# Patient Record
Sex: MALE | Race: OTHER | HISPANIC OR LATINO | Employment: UNEMPLOYED | URBAN - METROPOLITAN AREA
[De-identification: names, ages, dates, MRNs, and addresses within clinical notes are randomized per-mention and may not be internally consistent; named-entity substitution may affect disease eponyms.]

---

## 2020-12-07 ENCOUNTER — HOSPITAL ENCOUNTER (EMERGENCY)
Facility: HOSPITAL | Age: 35
Discharge: HOME | End: 2020-12-07
Attending: EMERGENCY MEDICINE
Payer: OTHER GOVERNMENT

## 2020-12-07 VITALS
RESPIRATION RATE: 16 BRPM | DIASTOLIC BLOOD PRESSURE: 80 MMHG | SYSTOLIC BLOOD PRESSURE: 148 MMHG | TEMPERATURE: 99.4 F | HEART RATE: 74 BPM | OXYGEN SATURATION: 97 % | WEIGHT: 192.68 LBS

## 2020-12-07 DIAGNOSIS — Z20.822 ENCOUNTER FOR LABORATORY TESTING FOR COVID-19 VIRUS: ICD-10-CM

## 2020-12-07 DIAGNOSIS — B34.9 VIRAL SYNDROME: Primary | ICD-10-CM

## 2020-12-07 PROCEDURE — 99282 EMERGENCY DEPT VISIT SF MDM: CPT | Performed by: PHYSICIAN ASSISTANT

## 2020-12-07 PROCEDURE — 99283 EMERGENCY DEPT VISIT LOW MDM: CPT

## 2020-12-07 PROCEDURE — U0003 INFECTIOUS AGENT DETECTION BY NUCLEIC ACID (DNA OR RNA); SEVERE ACUTE RESPIRATORY SYNDROME CORONAVIRUS 2 (SARS-COV-2) (CORONAVIRUS DISEASE [COVID-19]), AMPLIFIED PROBE TECHNIQUE, MAKING USE OF HIGH THROUGHPUT TECHNOLOGIES AS DESCRIBED BY CMS-2020-01-R: HCPCS | Performed by: PHYSICIAN ASSISTANT

## 2020-12-08 LAB — SARS-COV-2 RNA SPEC QL NAA+PROBE: NOT DETECTED

## 2022-06-06 ENCOUNTER — HOSPITAL ENCOUNTER (EMERGENCY)
Facility: HOSPITAL | Age: 37
Discharge: HOME | End: 2022-06-06
Attending: EMERGENCY MEDICINE
Payer: MEDICARE

## 2022-06-06 VITALS
RESPIRATION RATE: 16 BRPM | HEART RATE: 102 BPM | OXYGEN SATURATION: 96 % | TEMPERATURE: 98.4 F | SYSTOLIC BLOOD PRESSURE: 135 MMHG | DIASTOLIC BLOOD PRESSURE: 70 MMHG

## 2022-06-06 DIAGNOSIS — R56.9 SEIZURE (HCC): Primary | ICD-10-CM

## 2022-06-06 PROCEDURE — 99284 EMERGENCY DEPT VISIT MOD MDM: CPT

## 2022-06-06 PROCEDURE — 99282 EMERGENCY DEPT VISIT SF MDM: CPT | Performed by: EMERGENCY MEDICINE

## 2022-06-06 NOTE — ED NOTES
Wife at bedside  Pt refusing blood sugar  Wife acct of seizure-"he was shaking, his eyes rolled back, he was foaming at the mouth    This time when he came around he knew everyone a lot quicker than usual "     Avinash Laurent RN  06/06/22 7597

## 2022-06-06 NOTE — ED ATTENDING ATTESTATION
6/6/2022  IPrince MD, saw and evaluated the patient  I have discussed the patient with the resident/non-physician practitioner and agree with the resident's/non-physician practitioner's findings, Plan of Care, and MDM as documented in the resident's/non-physician practitioner's note, except where noted  All available labs and Radiology studies were reviewed  I was present for key portions of any procedure(s) performed by the resident/non-physician practitioner and I was immediately available to provide assistance  At this point I agree with the current assessment done in the Emergency Department  I have conducted an independent evaluation of this patient a history and physical is as follows:    ED Course     Emergency Department Note- Mirtha Parker 39 y o  male MRN: [id number]    Unit/Bed#: ED 19 Encounter: [id number]    Mirtha Parker is a 39 y o  male who presents with   Chief Complaint   Patient presents with    Seizure - Prior Hx Of     BIBA with PD for seizure per wife  Pt does not wish to stay  A&O         History of Present Illness   HPI:  Mirhta Parker is a 39 y o  male who presents for evaluation of:  Seizures  Patient recently started on new seizure medication and has been compliant  Patient was smoking marijuana this am and had a seizure this am  Patient has had multiple seizures over the last 6 months  Patient denies any traumas, falls, and head strike  Patient was found by EMS to be post ictal      Review of Systems   Constitutional: Negative for chills and fever  HENT: Negative for congestion and rhinorrhea  Respiratory: Negative for cough and shortness of breath  Gastrointestinal: Negative for nausea and vomiting  Neurological: Positive for seizures and syncope  All other systems reviewed and are negative  Historical Information   History reviewed  No pertinent past medical history  History reviewed  No pertinent surgical history    Social History Social History     Substance and Sexual Activity   Alcohol Use Yes    Comment: occasional     Social History     Substance and Sexual Activity   Drug Use Yes    Types: Marijuana    Comment: 1/8th every 2-3days-medical card     Social History     Tobacco Use   Smoking Status Current Every Day Smoker    Packs/day: 0 25   Smokeless Tobacco Never Used     Family History: History reviewed  No pertinent family history  Meds/Allergies   PTA meds:   None     Allergies   Allergen Reactions    Penicillins      unknown       Objective   First Vitals:   Blood Pressure: 135/70 (06/06/22 0606)  Pulse: 102 (06/06/22 0607)  Temperature: 98 4 °F (36 9 °C) (06/06/22 0610)  Temp Source: Tympanic (06/06/22 0610)  Respirations: 16 (06/06/22 0610)  SpO2: 96 % (06/06/22 0607)    Current Vitals:   Blood Pressure: 135/70 (06/06/22 0606)  Pulse: 102 (06/06/22 0607)  Temperature: 98 4 °F (36 9 °C) (06/06/22 0610)  Temp Source: Tympanic (06/06/22 0610)  Respirations: 16 (06/06/22 0610)  SpO2: 96 % (06/06/22 0607)    No intake or output data in the 24 hours ending 06/06/22 0613    Invasive Devices  Report    None                 Physical Exam  Vitals and nursing note reviewed  Constitutional:       General: He is not in acute distress  Appearance: Normal appearance  He is well-developed  HENT:      Head: Normocephalic and atraumatic  Right Ear: External ear normal       Left Ear: External ear normal       Nose: Nose normal       Mouth/Throat:      Pharynx: No oropharyngeal exudate  Eyes:      Conjunctiva/sclera: Conjunctivae normal       Pupils: Pupils are equal, round, and reactive to light  Cardiovascular:      Rate and Rhythm: Normal rate and regular rhythm  Pulmonary:      Effort: Pulmonary effort is normal  No respiratory distress  Abdominal:      General: Abdomen is flat  There is no distension  Palpations: Abdomen is soft  Musculoskeletal:         General: No deformity  Normal range of motion  Cervical back: Normal range of motion and neck supple  Skin:     General: Skin is warm and dry  Capillary Refill: Capillary refill takes less than 2 seconds  Neurological:      General: No focal deficit present  Mental Status: He is alert and oriented to person, place, and time  Mental status is at baseline  Coordination: Coordination normal    Psychiatric:         Mood and Affect: Mood normal          Behavior: Behavior normal          Thought Content: Thought content normal          Judgment: Judgment normal            Medical Decision Makin  Seizure episode this am: patient is back to baseline and is refusing work up in the ED  Patient is scheduled to f/u with neurosurgery at [facility]  Patient does not drive  No results found for this or any previous visit (from the past 36 hour(s))  No orders to display         Portions of the record may have been created with voice recognition software  Occasional wrong word or "sound a like" substitutions may have occurred due to the inherent limitations of voice recognition software  Read the chart carefully and recognize, using context, where substitutions have occurred          Critical Care Time  Procedures

## 2022-06-06 NOTE — ED PROVIDER NOTES
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, currently presenting brought in by EMS following a witnessed seizure  As per EMS, patient had a seizure that was witnessed by his wife earlier this morning  This lasted for several seconds to minutes  It seems to be described this generalized body shaking  Because of this his wife called EMS  On arrival, the patient initially appeared postictal has per EMS, however he has gradually returned to his baseline mentation en route to the hospital   He states he has been taking his medications as prescribed  He has not been ill at all lately  He denies fevers or chills  He denies any recent trauma or head strikes  Denies any headaches  He denies any numbness, tingling, weakness  He states that he did not hurt himself during this seizure, and does not have any pain anywhere  He has no neck stiffness  He did not bite his tongue  He denies any other drug use  His wife is bedside and states that he is acting his normal self  He is requesting to leave  None       History reviewed  No pertinent past medical history  History reviewed  No pertinent surgical history  History reviewed  No pertinent family history  I have reviewed and agree with the history as documented  E-Cigarette/Vaping     E-Cigarette/Vaping Substances     Social History     Tobacco Use    Smoking status: Current Every Day Smoker     Packs/day: 0 25    Smokeless tobacco: Never Used   Substance Use Topics    Alcohol use: Yes     Comment: occasional    Drug use: Yes     Types: Marijuana     Comment: 1/8th every 2-3days-medical card        Review of Systems   Constitutional: Negative for chills and fever  HENT: Negative for sore throat      Eyes: Negative for visual disturbance  Respiratory: Negative for cough and shortness of breath  Cardiovascular: Negative for chest pain and leg swelling  Gastrointestinal: Negative for abdominal pain, constipation, diarrhea, nausea and vomiting  Genitourinary: Negative for dysuria  Musculoskeletal: Negative for back pain, neck pain and neck stiffness  Skin: Negative for rash  Neurological: Positive for seizures and syncope  Negative for dizziness, weakness, light-headedness, numbness and headaches  Psychiatric/Behavioral: Negative for agitation  All other systems reviewed and are negative  Physical Exam  ED Triage Vitals   Temperature Pulse Respirations Blood Pressure SpO2   06/06/22 0610 06/06/22 0607 06/06/22 0610 06/06/22 0606 06/06/22 0607   98 4 °F (36 9 °C) 102 16 135/70 96 %      Temp Source Heart Rate Source Patient Position - Orthostatic VS BP Location FiO2 (%)   06/06/22 0610 -- -- -- --   Tympanic          Pain Score       --                    Orthostatic Vital Signs  Vitals:    06/06/22 0606 06/06/22 0607   BP: 135/70    Pulse:  102       Physical Exam  Vitals and nursing note reviewed  Constitutional:       General: He is not in acute distress  Appearance: Normal appearance  He is not ill-appearing or toxic-appearing  HENT:      Head: Normocephalic and atraumatic  Right Ear: External ear normal       Left Ear: External ear normal       Nose: Nose normal       Mouth/Throat:      Mouth: Mucous membranes are moist       Comments: No tongue biting  Eyes:      General: No visual field deficit or scleral icterus  Right eye: No discharge  Left eye: No discharge  Extraocular Movements: Extraocular movements intact  Conjunctiva/sclera: Conjunctivae normal       Pupils: Pupils are equal, round, and reactive to light  Cardiovascular:      Rate and Rhythm: Normal rate and regular rhythm  Pulses: Normal pulses  Heart sounds: Normal heart sounds   No murmur heard     No friction rub  No gallop  Pulmonary:      Effort: Pulmonary effort is normal  No respiratory distress  Breath sounds: Normal breath sounds  No wheezing, rhonchi or rales  Abdominal:      General: Abdomen is flat  There is no distension  Palpations: Abdomen is soft  There is no mass  Tenderness: There is no abdominal tenderness  Genitourinary:     Comments: Deferred  Musculoskeletal:         General: Normal range of motion  Cervical back: Normal range of motion  No rigidity or tenderness  Right lower leg: No edema  Left lower leg: No edema  Comments: No signs of trauma or shoulder dislocation   Skin:     General: Skin is warm and dry  Neurological:      General: No focal deficit present  Mental Status: He is alert and oriented to person, place, and time  Mental status is at baseline  GCS: GCS eye subscore is 4  GCS verbal subscore is 5  GCS motor subscore is 6  Cranial Nerves: Cranial nerves are intact  No cranial nerve deficit, dysarthria or facial asymmetry  Sensory: Sensation is intact  No sensory deficit  Motor: Motor function is intact  No pronator drift  Coordination: Coordination is intact  Finger-Nose-Finger Test and Heel to [place] nida Test normal       Comments: As per wife who is bedside, patient is at baseline, no intention tremor, no tongue fasiculations   Psychiatric:         Mood and Affect: Mood normal          ED Medications  Medications - No data to display    Diagnostic Studies  Results Reviewed     None                 No orders to display         Procedures  Procedures      ED Course                                       MDM  Number of Diagnoses or Management Options  Seizure [facility]): new and requires workup  Diagnosis management comments: Patient is a 39year old male with a diagnosed seizure disorder, presenting at his baseline in a non convulsive state following a witnessed seizure  Impression is breakthrough seizure  Doubt other causes of seizure including drug / toxin etiologies (ETOH, stimulants, medication side effects), metabolic disturbances (glucose, Na), acute CNS infections (meningitis, encephalitis, abscess), ICH / tumor / CVA  Presentation not consistent with non-epileptic type seizure to include syncope, neurologic etiologies (vertebrobasilar insufficiency, movement disorder, migraine), impact seizure related to head trauma  Discussed and offered further workup for this suspected seizure including labwork  Patient and wife currently refusing and requesting to leave  Using shared decision making, discussed patient calling his neurologist promptly upon discharge regarding his visit for further evaluation and management at discretion of neurologist  Andrés Canela patient that he may not drive (which he already does not) or participate in high risk activities secondary to his seizure history, which he is well aware of  Patient seems to understand this plan and is agreeable  All questions answered  Patient discharged home with return precautions  Amount and/or Complexity of Data Reviewed  Obtain history from someone other than the patient: yes  Review and summarize past medical records: yes    Patient Progress  Patient progress: stable      Disposition  Final diagnoses:   Seizure [facility])     Time reflects when diagnosis was documented in both MDM as applicable and the Disposition within this note     Time User Action Codes Description Comment    6/6/2022  6:23 AM Chris Velez Add [R56 9] Seizure [facility])       ED Disposition     ED Disposition   Discharge    Condition   Stable    Date/Time   Mon Jun 6, 2022  6:25 AM    Comment   Mukesh Burgos discharge to home/self care  Follow-up Information    None         There are no discharge medications for this patient  No discharge procedures on file      PDMP Review     None           ED Provider  Attending physically available and evaluated [facility] Bonilla HERNANDEZ managed the patient along with the ED Attending      Electronically Signed by         Donis Coreas DO  06/06/22 5688

## 2022-06-06 NOTE — DISCHARGE INSTRUCTIONS
You were seen in the ED today with a seizure  Please continue to follow up with your neurologist  Please call them today to inform them you had a seizure  Please continue to take your medications as prescribed  Please return to the ED if you have worsening symptoms, or any other concerns

## 2023-02-25 ENCOUNTER — APPOINTMENT (EMERGENCY)
Dept: RADIOLOGY | Facility: HOSPITAL | Age: 38
End: 2023-02-25

## 2023-02-25 ENCOUNTER — HOSPITAL ENCOUNTER (EMERGENCY)
Facility: HOSPITAL | Age: 38
Discharge: ANOTHER HOSPITAL | End: 2023-02-25
Attending: EMERGENCY MEDICINE

## 2023-02-25 ENCOUNTER — APPOINTMENT (EMERGENCY)
Dept: CT IMAGING | Facility: HOSPITAL | Age: 38
End: 2023-02-25

## 2023-02-25 VITALS
RESPIRATION RATE: 20 BRPM | DIASTOLIC BLOOD PRESSURE: 73 MMHG | SYSTOLIC BLOOD PRESSURE: 123 MMHG | WEIGHT: 152 LBS | HEART RATE: 82 BPM | TEMPERATURE: 99.3 F | BODY MASS INDEX: 24.72 KG/M2 | OXYGEN SATURATION: 98 %

## 2023-02-25 DIAGNOSIS — I62.9 ACUTE INTRACRANIAL HEMORRHAGE (HCC): Primary | ICD-10-CM

## 2023-02-25 DIAGNOSIS — R56.9 SEIZURE (HCC): ICD-10-CM

## 2023-02-25 LAB
ALBUMIN SERPL BCP-MCNC: 4.8 G/DL (ref 3.5–5)
ALP SERPL-CCNC: 61 U/L (ref 43–122)
ALT SERPL W P-5'-P-CCNC: 30 U/L
ANION GAP SERPL CALCULATED.3IONS-SCNC: 26 MMOL/L (ref 5–14)
APTT PPP: 24 SECONDS (ref 23–37)
AST SERPL W P-5'-P-CCNC: 38 U/L (ref 17–59)
BASOPHILS # BLD AUTO: 0.09 THOUSANDS/ÂΜL (ref 0–0.1)
BASOPHILS NFR BLD AUTO: 1 % (ref 0–1)
BILIRUB SERPL-MCNC: 0.54 MG/DL (ref 0.2–1)
BUN SERPL-MCNC: 13 MG/DL (ref 5–25)
CALCIUM SERPL-MCNC: 9.3 MG/DL (ref 8.4–10.2)
CHLORIDE SERPL-SCNC: 104 MMOL/L (ref 96–108)
CO2 SERPL-SCNC: 10 MMOL/L (ref 21–32)
CREAT SERPL-MCNC: 0.91 MG/DL (ref 0.7–1.5)
EOSINOPHIL # BLD AUTO: 0.39 THOUSAND/ÂΜL (ref 0–0.61)
EOSINOPHIL NFR BLD AUTO: 2 % (ref 0–6)
ERYTHROCYTE [DISTWIDTH] IN BLOOD BY AUTOMATED COUNT: 14.3 % (ref 11.6–15.1)
GFR SERPL CREATININE-BSD FRML MDRD: 107 ML/MIN/1.73SQ M
GLUCOSE SERPL-MCNC: 122 MG/DL (ref 65–140)
GLUCOSE SERPL-MCNC: 134 MG/DL (ref 70–99)
HCT VFR BLD AUTO: 49.4 % (ref 36.5–49.3)
HGB BLD-MCNC: 14.5 G/DL (ref 12–17)
IMM GRANULOCYTES # BLD AUTO: 0.18 THOUSAND/UL (ref 0–0.2)
IMM GRANULOCYTES NFR BLD AUTO: 1 % (ref 0–2)
INR PPP: 1.02 (ref 0.84–1.19)
LYMPHOCYTES # BLD AUTO: 4.92 THOUSANDS/ÂΜL (ref 0.6–4.47)
LYMPHOCYTES NFR BLD AUTO: 25 % (ref 14–44)
MCH RBC QN AUTO: 27.2 PG (ref 26.8–34.3)
MCHC RBC AUTO-ENTMCNC: 29.4 G/DL (ref 31.4–37.4)
MCV RBC AUTO: 93 FL (ref 82–98)
MONOCYTES # BLD AUTO: 1.8 THOUSAND/ÂΜL (ref 0.17–1.22)
MONOCYTES NFR BLD AUTO: 9 % (ref 4–12)
NEUTROPHILS # BLD AUTO: 12.52 THOUSANDS/ÂΜL (ref 1.85–7.62)
NEUTS SEG NFR BLD AUTO: 62 % (ref 43–75)
NRBC BLD AUTO-RTO: 0 /100 WBCS
PLATELET # BLD AUTO: 306 THOUSANDS/UL (ref 149–390)
PMV BLD AUTO: 9.6 FL (ref 8.9–12.7)
POTASSIUM SERPL-SCNC: 3.8 MMOL/L (ref 3.5–5.3)
PROT SERPL-MCNC: 8 G/DL (ref 6.4–8.4)
PROTHROMBIN TIME: 13.7 SECONDS (ref 11.6–14.5)
RBC # BLD AUTO: 5.33 MILLION/UL (ref 3.88–5.62)
SODIUM SERPL-SCNC: 140 MMOL/L (ref 135–147)
TSH SERPL DL<=0.05 MIU/L-ACNC: 3.52 UIU/ML (ref 0.45–4.5)
WBC # BLD AUTO: 19.9 THOUSAND/UL (ref 4.31–10.16)

## 2023-02-25 RX ORDER — ACETAMINOPHEN 325 MG/1
975 TABLET ORAL ONCE
Status: DISCONTINUED | OUTPATIENT
Start: 2023-02-25 | End: 2023-02-25 | Stop reason: HOSPADM

## 2023-02-25 RX ORDER — LORAZEPAM 2 MG/ML
2 INJECTION INTRAMUSCULAR ONCE
Status: COMPLETED | OUTPATIENT
Start: 2023-02-25 | End: 2023-02-25

## 2023-02-25 RX ORDER — GINSENG 100 MG
1 CAPSULE ORAL ONCE
Status: COMPLETED | OUTPATIENT
Start: 2023-02-25 | End: 2023-02-25

## 2023-02-25 RX ORDER — LORAZEPAM 2 MG/ML
INJECTION INTRAMUSCULAR
Status: DISCONTINUED
Start: 2023-02-25 | End: 2023-02-25 | Stop reason: HOSPADM

## 2023-02-25 RX ORDER — LORAZEPAM 2 MG/ML
2 INJECTION INTRAMUSCULAR ONCE
Status: DISCONTINUED | OUTPATIENT
Start: 2023-02-25 | End: 2023-02-25

## 2023-02-25 RX ORDER — LEVETIRACETAM 500 MG/1
1000 TABLET ORAL 2 TIMES DAILY
Qty: 120 TABLET | Refills: 0 | Status: SHIPPED | OUTPATIENT
Start: 2023-02-25 | End: 2023-03-27

## 2023-02-25 RX ORDER — SODIUM CHLORIDE 9 MG/ML
100 INJECTION, SOLUTION INTRAVENOUS CONTINUOUS
Status: DISCONTINUED | OUTPATIENT
Start: 2023-02-25 | End: 2023-02-25 | Stop reason: HOSPADM

## 2023-02-25 RX ORDER — ONDANSETRON 2 MG/ML
4 INJECTION INTRAMUSCULAR; INTRAVENOUS ONCE
Status: COMPLETED | OUTPATIENT
Start: 2023-02-25 | End: 2023-02-25

## 2023-02-25 RX ADMIN — LORAZEPAM 2 MG: 2 INJECTION INTRAMUSCULAR; INTRAVENOUS at 11:54

## 2023-02-25 RX ADMIN — LEVETIRACETAM 1000 MG: 100 INJECTION, SOLUTION INTRAVENOUS at 12:38

## 2023-02-25 RX ADMIN — SODIUM CHLORIDE 100 ML/HR: 0.9 INJECTION, SOLUTION INTRAVENOUS at 14:43

## 2023-02-25 RX ADMIN — BACITRACIN 1 SMALL APPLICATION: 500 OINTMENT TOPICAL at 14:18

## 2023-02-25 RX ADMIN — ONDANSETRON 4 MG: 2 INJECTION INTRAMUSCULAR; INTRAVENOUS at 18:13

## 2023-02-25 RX ADMIN — LORAZEPAM 2 MG: 2 INJECTION INTRAMUSCULAR; INTRAVENOUS at 12:05

## 2023-02-25 RX ADMIN — SODIUM CHLORIDE 1000 ML: 0.9 INJECTION, SOLUTION INTRAVENOUS at 13:09

## 2023-02-25 NOTE — ED NOTES
Neuro assessment deferred as patient is sleeping; no distress noted; continuous cardiac and pulse oximetry continues     Conor Costa RN  02/25/23 [id number]

## 2023-02-25 NOTE — Clinical Note
Date: 2/25/2023  Patient: Jenaro Bucio  Admitted: 2/25/2023 11:05 AM  Attending Provider: Laurie Jones, *    Jenaro Bucio or his authorized caregiver has made the decision for the patient to leave the emergency department agains t the advice of his attending physician  He or his authorized caregiver has been informed and understands the inherent risks, including death, recurrent seizure  He or his authorized caregiver has decided to accept the responsibility for this decisi on  Jenaro Bucio and all necessary parties have been advised that he may return for further evaluation or treatment  His condition at time of discharge was 1118  Jenaro Bucio had current vital signs as follows: There were no vitals take n for this visit

## 2023-02-25 NOTE — ED NOTES
Per wife's request, patients mother was called  L/M for patients mother to contact ED  Patient wife tearful at bedside speaking with this RN and provider        Cande Ward RN  02/25/23 [id number]

## 2023-02-25 NOTE — ED NOTES
Patient transported to [address] [place], [place] [address]WILIAM  02/25/23 [address], RN  02/25/23 1787

## 2023-02-25 NOTE — ED NOTES
Patient continues to refuse any treatment or interventions; patient states he is fine and he wants to go home  Patient will not allow me to obtain vital signs and he will not answer any assessment questions, he continues to say he is fine and he wants to go home       Rick Aleman RN  02/25/23 2358

## 2023-02-25 NOTE — ED NOTES
Patient vomiting yellow bile and c/o headache   Provider aware, zofran administered, will re attempt tylenol     Franklin Ernandez RN  02/25/23 9702

## 2023-02-25 NOTE — ED PROVIDER NOTES
History  Chief Complaint   Patient presents with   • Behavior Problem     Patient arrives with EMS and police  Per wife patient had a seizure at home, he then ran outside barefoot and his wife called EMS d/t this behavior  Patient does not believe he had a seizure  Patient is refusing triage process     26-year-old male history of seizures, AVM resection 7 months ago at [facility] brought in by EMS after a seizure  Wife states that he used to have seizures, was on Keppra and Depakote, had the resection 7 months ago and was told that he would not have seizures after that  Was maintained on Depakote but patient felt that since he was not having seizures and he had the surgery he did not need the medications and has been off for at least a month  This morning, wife found patient on the ground and had a seizure  Afterwards, patient was confused, postictal, ran away from wife, ran into the street barefoot in 20 degree weather  Was picked up by EMS and police  Patient initially refused all care and evaluation  He was A&Ox4 though denied having a seizure  Says that he was walking down the street to go to his mother's house  Cannot account for why he was barefoot  Patient was initially going to leave AMA saying that he knew he could have a potential head bleed or severely abnormal vital signs but still did not want evaluation, he verbalized that he knew he could have another seizure and that he could die if not further evaluated  Per wife, no recent sicknesses, drug use, alcohol use  As we were getting ready to sign AMA, patient did agree to at least get a CT head  As he got to the CT scanner, had a witnessed tonic-clonic seizure  Was unconscious for approximately 3 minutes  Did not fall or hit his head during the seizure  Rapid response was called  On arrival, patient was confused, bilaterally dilated pupils    Given 2 mg Ativan x2 as patient was too confused to cooperate with exam   At times did seem to stop responding and eyes were rolling back and forth however he would come back around after some stimulation  None       History reviewed  No pertinent past medical history  History reviewed  No pertinent surgical history  History reviewed  No pertinent family history  I have reviewed and agree with the history as documented  E-Cigarette/Vaping     E-Cigarette/Vaping Substances   • Nicotine No    • THC No    • CBD No    • Flavoring No    • Other No    • Unknown No      Social History     Tobacco Use   • Smoking status: Every Day     Packs/day: 0 25     Types: Cigarettes   • Smokeless tobacco: Never   Substance Use Topics   • Alcohol use: Yes     Comment: occasional   • Drug use: Yes     Types: Marijuana     Comment: 1/8th every 2-3days-medical card        Review of Systems   Unable to perform ROS: Mental status change   Skin: Positive for wound  Neurological: Positive for seizures  Psychiatric/Behavioral: Positive for confusion  Physical Exam  ED Triage Vitals   Temperature Pulse Respirations Blood Pressure SpO2   02/25/23 1117 02/25/23 1117 02/25/23 1117 02/25/23 1117 02/25/23 1117   98 °F (36 7 °C) 93 18 131/78 96 %      Temp Source Heart Rate Source Patient Position - Orthostatic VS BP Location FiO2 (%)   02/25/23 1746 02/25/23 1856 02/25/23 1856 02/25/23 1856 --   Oral Monitor Lying Left arm       Pain Score       --                    Orthostatic Vital Signs  Vitals:    02/25/23 1539 02/25/23 1600 02/25/23 1746 02/25/23 1856   BP: 99/56 102/58 129/72 123/73   Pulse: 84 84 99 82   Patient Position - Orthostatic VS:    Lying       Physical Exam  Vitals and nursing note reviewed  Constitutional:       General: He is in acute distress  HENT:      Head: Normocephalic and atraumatic  Right Ear: External ear normal       Left Ear: External ear normal       Nose: Nose normal    Eyes:      General: No scleral icterus  Right eye: No discharge  Left eye: No discharge  Pupils: Pupils are equal, round, and reactive to light  Cardiovascular:      Rate and Rhythm: Normal rate  Pulses: Normal pulses  Pulmonary:      Effort: Pulmonary effort is normal  No respiratory distress  Breath sounds: No stridor  Abdominal:      General: Abdomen is flat  There is no distension  Hernia: No hernia is present  Musculoskeletal:         General: Normal range of motion  Cervical back: Normal range of motion  Feet:    Skin:     General: Skin is warm and dry  Coloration: Skin is not jaundiced  Neurological:      General: No focal deficit present  Mental Status: He is alert and oriented to person, place, and time  Mental status is at baseline  Psychiatric:         Mood and Affect: Mood normal          Behavior: Behavior normal          ED Medications  Medications   levETIRAcetam (KEPPRA) 1,000 mg in sodium chloride 0 9 % 100 mL IVPB (0 mg Intravenous Stopped 2/25/23 1255)   LORazepam (ATIVAN) injection 2 mg (2 mg Intramuscular Given 2/25/23 1154)   LORazepam (ATIVAN) injection 2 mg (2 mg Intramuscular Given 2/25/23 1205)   sodium chloride 0 9 % bolus 1,000 mL (0 mL Intravenous Stopped 2/25/23 1411)   bacitracin topical ointment 1 small application (1 small application Topical Given 2/25/23 1418)   ondansetron (ZOFRAN) injection 4 mg (4 mg Intravenous Given 2/25/23 1813)       Diagnostic Studies  Results Reviewed     Procedure Component Value Units Date/Time    TSH, 3rd generation with Free T4 reflex [[id number]]  (Normal) Collected: 02/25/23 1224    Lab Status: Final result Specimen: Blood from Arm, Right Updated: 02/25/23 1324     TSH 3RD GENERATON 3 520 uIU/mL     Narrative:      Patients undergoing fluorescein dye angiography may retain small amounts of fluorescein in the body for 48-72 hours post procedure  Samples containing fluorescein can produce falsely depressed TSH values   If the patient had this procedure,a specimen should be resubmitted post fluorescein clearance        Comprehensive metabolic panel [[id number]]  (Abnormal) Collected: 02/25/23 1224    Lab Status: Final result Specimen: Blood from Arm, Right Updated: 02/25/23 1248     Sodium 140 mmol/L      Potassium 3 8 mmol/L      Chloride 104 mmol/L      CO2 10 mmol/L      ANION GAP 26 mmol/L      BUN 13 mg/dL      Creatinine 0 91 mg/dL      Glucose 134 mg/dL      Calcium 9 3 mg/dL      AST 38 U/L      ALT 30 U/L      Alkaline Phosphatase 61 U/L      Total Protein 8 0 g/dL      Albumin 4 8 g/dL      Total Bilirubin 0 54 mg/dL      eGFR 107 ml/min/1 73sq m     Narrative:      [place] guidelines for Chronic Kidney Disease (CKD):   •  Stage 1 with normal or high GFR (GFR > 90 mL/min/1 73 square meters)  •  Stage 2 Mild CKD (GFR = 60-89 mL/min/1 73 square meters)  •  Stage 3A Moderate CKD (GFR = 45-59 mL/min/1 73 square meters)  •  Stage 3B Moderate CKD (GFR = 30-44 mL/min/1 73 square meters)  •  Stage 4 Severe CKD (GFR = 15-29 mL/min/1 73 square meters)  •  Stage 5 End Stage CKD (GFR <15 mL/min/1 73 square meters)  Note: GFR calculation is accurate only with a steady state creatinine    Protime-INR [[id number]]  (Normal) Collected: 02/25/23 1224    Lab Status: Final result Specimen: Blood from Arm, Right Updated: 02/25/23 1243     Protime 13 7 seconds      INR 1 02    APTT [[id number]]  (Normal) Collected: 02/25/23 1224    Lab Status: Final result Specimen: Blood from Arm, Right Updated: 02/25/23 1243     PTT 24 seconds     CBC and differential [[id number]]  (Abnormal) Collected: 02/25/23 1224    Lab Status: Final result Specimen: Blood from Arm, Right Updated: 02/25/23 1231     WBC 19 90 Thousand/uL      RBC 5 33 Million/uL      Hemoglobin 14 5 g/dL      Hematocrit 49 4 %      MCV 93 fL      MCH 27 2 pg      MCHC 29 4 g/dL      RDW 14 3 %      MPV 9 6 fL      Platelets 874 Thousands/uL      nRBC 0 /100 WBCs      Neutrophils Relative 62 %      Immat GRANS % 1 %      Lymphocytes Relative 25 %      Monocytes Relative 9 %      Eosinophils Relative 2 %      Basophils Relative 1 %      Neutrophils Absolute 12 52 Thousands/µL      Immature Grans Absolute 0 18 Thousand/uL      Lymphocytes Absolute 4 92 Thousands/µL      Monocytes Absolute 1 80 Thousand/µL      Eosinophils Absolute 0 39 Thousand/µL      Basophils Absolute 0 09 Thousands/µL     Fingerstick Glucose (POCT) [[id number]]  (Normal) Collected: 02/25/23 1222    Lab Status: Final result Updated: 02/25/23 1223     POC Glucose 122 mg/dl                  XR foot 3+ vw right   Final Result by Crys Crockett MD (02/26 0826)      No acute osseous abnormality  Plantar spur  Workstation performed: [id number]         CT head without contrast   Final Result by Markus Drew MD (02/25 0922)      Focal 1 4 cm region of acute blood products noted in the left lateral frontal lobe and adjacent subarachnoid space with regional surgical clips consistent with the patient's reported history of AVM clipping  There is no significant parenchymal edema or    regional mass effect at this time  Direct comparison with prior imaging is recommended  Follow-up with the patient's treating neurosurgical/neuro interventional service is recommended  Postoperative changes of left lateral frontal parietal craniotomy and small postsurgical fluid collection subjacent to the cranial flap             I personally discussed this study with Dr Oswaldo Weathers on 2/25/2023 at 12:57 PM                       I personally discussed this study with Becky Quiñones on 2/25/2023 at 12:50 PM                            Workstation performed: [id number]               Procedures  Procedures      ED Course  ED Course as of 02/26/23 1813   Sat Feb 25, 2023   1225 POC Glucose: 122   1232 WBC(!): 19 90  Possibly reactive from the seizure   1301 Focal 1 4 cm region of acute blood products noted in the left lateral frontal lobe and adjacent subarachnoid space with regional surgical clips consistent with the patient's reported history of AVM clipping  There is no significant parenchymal edema or   regional mass effect at this time  Direct comparison with prior imaging is recommended  Follow-up with the patient's treating neurosurgical/neuro interventional service is recommended      Postoperative changes of left lateral frontal parietal craniotomy and small postsurgical fluid collection subjacent to the cranial flap  1325 TSH 3RD GENERATON: 3 520                                       Medical Decision Making  Initial Impression: Worried about his recurrent seizures  Possibility that this is just from not being on his medications but also need to assess him for possibility of intracranial hemorrhage  We will try to obtain blood work and blood glucose level if patient is agreeable to these exams  Initial Work Up: CT head    Final Impression: CT scan shows evidence of acute hemorrhage, though no prior imaging to compare against, no mention of previous hyperdensities in previous reports  Not in status epilepticus  Not hypoglycemic  No other explanation for patient's presentation  Will have patient transferred to [facility] for further evaluation by neurosurgery and for continuity of care  In regards to pt's toe injury, did an xray which did not show any acute fx or dislocation  Tdap was up to date based on chart review  Wound cleaned and bacitracin applied  Acute intracranial hemorrhage ([facility] [address] ): complicated acute illness or injury with systemic symptoms that poses a threat to life or bodily functions  Seizure [facility]): complicated acute illness or injury with systemic symptoms that poses a threat to life or bodily functions  Amount and/or Complexity of Data Reviewed  Independent Historian: jere  External Data Reviewed: radiology and notes  Labs: ordered  Decision-making details documented in ED Course  Radiology: ordered    Discussion of management or test interpretation with external provider(s): Coordinated care with [facility] neurosurgery and neuro-critical care regarding comparing new images and transfer of pt    Risk  OTC drugs  Prescription drug management  Risk Details: Treated patient for life threatening intracranial hemorrhage, recurrent seizures   Also assessed for electrolyte abnormalities, hypoglycemia          Disposition  Final diagnoses:   Seizure ([facility] [address] )   Acute intracranial hemorrhage ([facility] [address] )     Time reflects when diagnosis was documented in both MDM as applicable and the Disposition within this note     Time User Action Codes Description Comment    2/25/2023 11:17 AM Cyrena Spruce Add [R56 9] Seizure ([facility] [address] )     2/25/2023  1:49 PM Cyrena Spruce Add [I62 9] Acute intracranial hemorrhage ([facility] [address] )     2/25/2023  1:49 PM Cyrena Spruce Modify [R56 9] Seizure ([facility] [address] )     2/25/2023  1:49 PM Cyrena Spruce Modify [I62 9] Acute intracranial hemorrhage [facility])       ED Disposition     ED Disposition   Transfer to Another [facility]    Condition   --    Date/Time   Sat Feb 25, 2023  2:23 PM    Comment   Quinten Benito should be transferred out to [facility]            MD Documentation    [address] [facility] Most Recent Value   Patient Condition The patient has been stabilized such that within reasonable medical probability, no material deterioration of the patient condition or the condition of the unborn child(emma) is likely to result from the transfer   Reason for Transfer Level of Care needed not available at this facility   Benefits of Transfer Specialized equipment and/or services available at the receiving facility (Include comment)________________________   Risks of Transfer Potential for delay in receiving treatment, Loss of IV   Accepting Physician Dr Claritza Chiang Name, [address] [facility]    (Name & Tel number) Severiano Roup   Sending MD Lachelle Nino   Provider Certification General risk, such as traffic hazards, adverse weather conditions, rough terrain or turbulence, possible failure of equipment (including vehicle or aircraft), or consequences of actions of persons outside the control of the transport personnel      RN Documentation    Flowsheet Row Most [address] Name, [address] [facility]    (Name & Tel number) Priya Piero   Medications Reviewed with Next Provider of Service Yes   Transport Mode Ambulance   Level of Care Advanced life support   Copies of Medical Records Sent Radiology      Follow-up Information    None         Discharge Medication List as of 2/25/2023 11:19 AM      START taking these medications    Details   levETIRAcetam (Keppra) 500 mg tablet Take 2 tablets (1,000 mg total) by mouth 2 (two) times a day, Starting Sat 2/25/2023, Until Mon 3/27/2023, Print           No discharge procedures on file  PDMP Review     None           ED Provider  Attending physically available and evaluated Samir Daigle  DAVID managed the patient along with the ED Attending      Electronically Signed by         Yvonne Turpin MD  02/26/23 9901

## 2023-02-25 NOTE — ED NOTES
At approx 1150 a rapid response was called for this patient d/t seizure like activity while in hallway outside of cat scan  2mg IM ativan was given at 1154  Cat scan was re attempted and the patient became agitated which interrupted the completion of the scan  2mg IM ativan was given at 1205 and the scan was completed immediately after  Patient was transported back to ED and is currently resting with continuous cardiac and pulse oximetry monitoring  IV access was established        Suresh Flower RN  02/25/23 Valerie Gonsalves RN  02/25/23 7822

## 2023-02-25 NOTE — ED NOTES
Patient got out of bed and urinated on the floor, then got back into bed and placed blankets on himself     Hermes Carver RN  02/25/23 7881

## 2023-02-25 NOTE — ED NOTES
Patient OOB to urinate, urinal provided and the patient's clothes and linens were changed  Patient reports understanding the situation and appeared to understand when the physician explained to the patient that he would be transferred to    Patient went back to sleep and does not request anything at this time     Kev Velez RN  02/25/23 9641

## 2023-02-25 NOTE — EMTALA/ACUTE CARE TRANSFER
[facility] EMERGENCY DEPARTMENT  [address]  [address] [address]  [phone]  Dept: [phone]      EMTALA TRANSFER CONSENT    NAME Susie Du                                         1985                              MRN [id number]    I have been informed of my rights regarding examination, treatment, and transfer   by Dr Thomas Delarosa, *    Benefits: Specialized equipment and/or services available at the receiving facility (Include comment)________________________, Continuity of care (Neuro crit care)    Risks: Potential for delay in receiving treatment, Loss of IV      Consent for Transfer:  I acknowledge that my medical condition has been evaluated and explained to me by the emergency department physician or other qualified medical person and/or my attending physician, who has recommended that I be transferred to the service of  Accepting Physician: Dr Bhavin Powers at [address] [facility] Name, [address] : [address] [facility]  The above potential benefits of such transfer, the potential risks associated with such transfer, and the probable risks of not being transferred have been explained to me, and I fully understand them  The doctor has explained that, in my case, the benefits of transfer outweigh the risks  I agree to be transferred  I authorize the performance of emergency medical procedures and treatments upon me in both transit and upon arrival at the receiving facility  Additionally, I authorize the release of any and all medical records to the receiving facility and request they be transported with me, if possible  I understand that the safest mode of transportation during a medical emergency is an ambulance and that the Hospital advocates the use of this mode of transport   Risks of traveling to the receiving facility by car, including absence of medical control, life sustaining equipment, such as oxygen, and medical personnel has been explained to me and I fully understand them  (LOUISE CORRECT BOX BELOW)  [  ]  I consent to the stated transfer and to be transported by ambulance/helicopter  [  ]  I consent to the stated transfer, but refuse transportation by ambulance and accept full responsibility for my transportation by car  I understand the risks of non-ambulance transfers and I exonerate the Hospital and its staff from any deterioration in my condition that results from this refusal     X___________________________________________    DATE  23  TIME________  Signature of patient or legally responsible individual signing on patient behalf           RELATIONSHIP TO PATIENT_________________________          Provider Certification    NAME Chevy Yanes                                         1985                              MRN [id number]    A medical screening exam was performed on the above named patient  Based on the examination:    Condition Necessitating Transfer The primary encounter diagnosis was Acute intracranial hemorrhage ([address] )  A diagnosis of Seizure [facility]) was also pertinent to this visit      Patient Condition: The patient has been stabilized such that within reasonable medical probability, no material deterioration of the patient condition or the condition of the unborn child(emma) is likely to result from the transfer    Reason for Transfer: Level of Care needed not available at this facility    Transfer Requirements: [address]   · Space available and qualified personnel available for treatment as acknowledged by Blair De Los Santos  · Agreed to accept transfer and to provide appropriate medical treatment as acknowledged by       Dr Ebony Devi  · Appropriate medical records of the examination and treatment of the patient are provided at the time of transfer   [address] _______  · Transfer will be performed by qualified personnel from    and appropriate transfer equipment as required, including the use of necessary and appropriate life support measures  Provider Certification: I have examined the patient and explained the following risks and benefits of being transferred/refusing transfer to the patient/family:  General risk, such as traffic hazards, adverse weather conditions, rough terrain or turbulence, possible failure of equipment (including vehicle or aircraft), or consequences of actions of persons outside the control of the transport personnel      Based on these reasonable risks and benefits to the patient and/or the unborn child(emma), and based upon the information available at the time of the patient’s examination, I certify that the medical benefits reasonably to be expected from the provision of appropriate medical treatments at another medical facility outweigh the increasing risks, if any, to the individual’s medical condition, and in the case of labor to the unborn child, from effecting the transfer      X____________________________________________ DATE 02/25/23        TIME_______      ORIGINAL - SEND TO MEDICAL RECORDS   COPY - SEND WITH PATIENT DURING TRANSFER

## 2023-02-25 NOTE — ED NOTES
Charge nurse spoke with patient and patient's wife at length, patient agreed to have a cat scan     Darius Merino RN  02/25/23 2990

## 2023-02-25 NOTE — ED NOTES
This RN called patient's emergency contact, patients mother Domo  She states she will contact patient's wife and inform her to come back to ED        Shree Hidalgo RN  02/25/23 0682

## 2023-02-26 NOTE — ED NOTES
Emergency Department contact information given to receiving facility for nurse to get report if needed        Genie Saavedra RN  02/25/23 3469

## 2023-02-26 NOTE — ED CARE HANDOFF
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  ED Course / Workup Pending (followup): Patient accepted at [facility] neuro critical care awaiting transport  Through his stay no acute changes did have 1 episode of emesis that resolved with Zofran  He was given Tylenol for headache  Vital signs remained stable including blood pressure within target range  Neuro status remained reassuring  Transported to [facility] critical care                                       Procedures  MDM        Disposition  Final diagnoses:   Seizure ([facility] [place] [address] )   Acute intracranial hemorrhage ([facility] [place] [address] )     Time reflects when diagnosis was documented in both MDM as applicable and the Disposition within this note     Time User Action Codes Description Comment    2/25/2023 11:17 AM Lopez Fermin Add [R56 9] Seizure ([facility] [place] [address] )     2/25/2023  1:49 PM Lopez Fermin Add [I62 9] Acute intracranial hemorrhage ([facility] [place] [address] )     2/25/2023  1:49 PM Lopez Fermin Modify [R56 9] Seizure ([facility] [place] [address] )     2/25/2023  1:49 PM Lopez Fermin Modify [I62 9] Acute intracranial hemorrhage [facility])       ED Disposition     ED Disposition   Transfer to Another Pending sale to [facility]    Condition   --    Date/Time   Sat Feb 25, 2023  2:23 PM    Comment   Uriel eNvarez should be transferred out to [facility]            MD Documentation    Jacob Smith Most Recent Value   Patient Condition The patient has been stabilized such that within reasonable medical probability, no material deterioration of the patient condition or the condition of the unborn child(emma) is likely to result from the transfer   Reason for Transfer Level of Care needed not available at this facility   Benefits of Transfer Specialized equipment and/or services available at the receiving facility (Include comment)________________________   Risks of Transfer Potential for delay in receiving treatment, Loss of IV   Accepting Physician Dr Chuyita Gonzalez Name, [address] [facility]    (Name & Tel number) Bladimir De La Fuente   Sending MD Andres Macdonald   Provider Certification General risk, such as traffic hazards, adverse weather conditions, rough terrain or turbulence, possible failure of equipment (including vehicle or aircraft), or consequences of actions of persons outside the control of the transport personnel      RN Documentation    Flowsheet Row Most [address] [facility] Name, [address] [facility]    (Name & Tel number) Ivonne Reveles   Medications Reviewed with Next Provider of Service Yes   Transport Mode Ambulance   Level of Care Advanced life support   Copies of Medical Records Sent Radiology      Follow-up Information    None       Discharge Medication List as of 2/25/2023 11:19 AM      START taking these medications    Details   levETIRAcetam (Keppra) 500 mg tablet Take 2 tablets (1,000 mg total) by mouth 2 (two) times a day, Starting Sat 2/25/2023, Until Mon 3/27/2023, Print           No discharge procedures on file         ED Provider  Electronically Signed by     Sharon West MD  02/25/23 7800

## 2023-02-26 NOTE — ED ATTENDING ATTESTATION
2/25/2023  ILaurie MD, saw and evaluated the patient  I have discussed the patient with the resident/non-physician practitioner and agree with the resident's/non-physician practitioner's findings, Plan of Care, and MDM as documented in the resident's/non-physician practitioner's note, except where noted  All available labs and Radiology studies were reviewed  I was present for key portions of any procedure(s) performed by the resident/non-physician practitioner and I was immediately available to provide assistance  At this point I agree with the current assessment done in the Emergency Department  I have conducted an independent evaluation of this patient a history and physical is as follows:    28-year-old male presenting emerged department with seizure episode, while patient was in the hospital going to CAT scan patient had another seizure after which patient needed multiple doses of Ativan for his postictal agitation  CAT scan patient had acute 1 4 cm area of blood collection intracranially concerning for acute intraparenchymal hemorrhage likely causing his seizures  Patient recently had AVM repair at [facility] in July, case discussed with neurosurgeon at [facility] who recommends admission to neuro ICU  Patient accepted for transfer to neuro ICU to [address] given to reduce risk of further seizures  Laboratory evaluation shows labs consistent with having had recent seizures with elevated WBC       ED Course         Critical Care Time  CriticalCare Time  Performed by: Laurie Jones MD  Authorized by: Laurie Jones MD     Critical care provider statement:     Critical care time (minutes):  75    Critical care start time:  2/25/2023 12:00 PM    Critical care end time:  2/25/2023 4:17 PM    Critical care was necessary to treat or prevent imminent or life-threatening deterioration of the following conditions:  CNS failure or compromise    Critical care was time spent personally by me on the following activities:  Blood draw for specimens, obtaining history from patient or surrogate, development of treatment plan with patient or surrogate, discussions with consultants, evaluation of patient's response to treatment, examination of patient, interpretation of cardiac output measurements, ordering and performing treatments and interventions, ordering and review of laboratory studies, ordering and review of radiographic studies, re-evaluation of patient's condition, review of old charts and ventilator management

## 2024-09-08 ENCOUNTER — HOSPITAL ENCOUNTER (EMERGENCY)
Facility: HOSPITAL | Age: 39
Discharge: HOME | End: 2024-09-08
Attending: EMERGENCY MEDICINE | Admitting: EMERGENCY MEDICINE
Payer: MEDICARE

## 2024-09-08 VITALS
WEIGHT: 158.51 LBS | SYSTOLIC BLOOD PRESSURE: 104 MMHG | RESPIRATION RATE: 12 BRPM | HEART RATE: 104 BPM | OXYGEN SATURATION: 96 % | DIASTOLIC BLOOD PRESSURE: 69 MMHG | TEMPERATURE: 98 F

## 2024-09-08 DIAGNOSIS — R56.9 SEIZURE (HCC): Primary | ICD-10-CM

## 2024-09-08 PROCEDURE — 99283 EMERGENCY DEPT VISIT LOW MDM: CPT

## 2024-09-08 PROCEDURE — 99284 EMERGENCY DEPT VISIT MOD MDM: CPT | Performed by: EMERGENCY MEDICINE

## 2024-09-08 RX ORDER — LEVETIRACETAM 500 MG/1
1000 TABLET ORAL ONCE
Status: COMPLETED | OUTPATIENT
Start: 2024-09-08 | End: 2024-09-08

## 2024-09-08 RX ADMIN — LEVETIRACETAM 1000 MG: 500 TABLET, FILM COATED ORAL at 11:27

## 2024-09-08 NOTE — ED PROVIDER NOTES
History  Chief Complaint   Patient presents with    Medical Problem     Pt arrives for medical clearance. States he had a seizure at home this morning. Takes Depakote everyday, last dose was yesterday.      Patient here with police for medical clearance. Was at half-way intake, notified medical personnel he thinks he had a seizure this morning. Endorses missing his AM Keppra dose. Also endorsing using fentanyl about the same time. Last seizure was about 3 months ago. Follows with [facility]. States has meds. Requesting help with rehab, made aware that once he is cleared from police needs he is welcome to come back to ED for HOST assessment at any time.         Prior to Admission Medications   Prescriptions Last Dose Informant Patient Reported? Taking?   levETIRAcetam (Keppra) 500 mg tablet   No No   Sig: Take 2 tablets (1,000 mg total) by mouth 2 (two) times a day      Facility-Administered Medications: None       History reviewed. No pertinent past medical history.    History reviewed. No pertinent surgical history.    History reviewed. No pertinent family history.  I have reviewed and agree with the history as documented.    E-Cigarette/Vaping     E-Cigarette/Vaping Substances    Nicotine No     THC No     CBD No     Flavoring No     Other No     Unknown No      Social History     Tobacco Use    Smoking status: Every Day     Current packs/day: 0.25     Types: Cigarettes    Smokeless tobacco: Never   Substance Use Topics    Alcohol use: Yes     Comment: occasional    Drug use: Yes     Types: Marijuana     Comment: 1/8th every 2-3days-medical card       Review of Systems   Constitutional: Negative.  Negative for chills and fever.   HENT: Negative.  Negative for rhinorrhea, sore throat, trouble swallowing and voice change.    Eyes: Negative.  Negative for pain and visual disturbance.   Respiratory: Negative.  Negative for cough, shortness of breath and wheezing.    Cardiovascular: Negative.  Negative for chest pain and  palpitations.   Gastrointestinal:  Negative for abdominal pain, diarrhea, nausea and vomiting.   Genitourinary: Negative.  Negative for dysuria and frequency.   Musculoskeletal: Negative.  Negative for neck pain and neck stiffness.   Skin: Negative.  Negative for rash.   Neurological: Negative.  Negative for dizziness, speech difficulty, weakness, light-headedness and numbness.       Physical Exam  Physical Exam  Vitals and nursing note reviewed.   Constitutional:       General: He is not in acute distress.     Appearance: He is well-developed.   HENT:      Head: Normocephalic and atraumatic.   Eyes:      Conjunctiva/sclera: Conjunctivae normal.   Cardiovascular:      Rate and Rhythm: Normal rate and regular rhythm.      Heart sounds: No murmur heard.  Pulmonary:      Effort: Pulmonary effort is normal. No respiratory distress.      Breath sounds: Normal breath sounds.   Abdominal:      Palpations: Abdomen is soft.      Tenderness: There is no abdominal tenderness.   Musculoskeletal:         General: No swelling.      Cervical back: Neck supple.   Skin:     General: Skin is warm and dry.      Capillary Refill: Capillary refill takes less than 2 seconds.   Neurological:      Mental Status: He is alert.      GCS: GCS eye subscore is 4. GCS verbal subscore is 5. GCS motor subscore is 6.      Cranial Nerves: Cranial nerves 2-12 are intact.      Sensory: Sensation is intact.      Motor: Motor function is intact.      Coordination: Coordination is intact.      Gait: Gait is intact.   Psychiatric:         Mood and Affect: Mood normal.         Vital Signs  ED Triage Vitals [09/08/24 1121]   Temperature Pulse Respirations Blood Pressure SpO2   98 °F (36.7 °C) 104 12 104/69 96 %      Temp Source Heart Rate Source Patient Position - Orthostatic VS BP Location FiO2 (%)   Oral Monitor Lying Left arm --      Pain Score       --           Vitals:    09/08/24 1121   BP: 104/69   Pulse: 104   Patient Position - Orthostatic VS: Lying          Visual Acuity      ED Medications  Medications   levETIRAcetam (KEPPRA) tablet 1,000 mg (1,000 mg Oral Given 9/8/24 1127)       Diagnostic Studies  Results Reviewed       None                   No orders to display              Procedures  Procedures         ED Course                                 SBIRT 22yo+      Flowsheet Row Most Recent Value   Initial Alcohol Screen: US AUDIT-C     1. How often do you have a drink containing alcohol? 0 Filed at: 09/08/2024 1128   2. How many drinks containing alcohol do you have on a typical day you are drinking?  0 Filed at: 09/08/2024 1128   3a. Male UNDER 65: How often do you have five or more drinks on one occasion? 0 Filed at: 09/08/2024 1128   Audit-C Score 0 Filed at: 09/08/2024 1128   LOUANN: How many times in the past year have you...    Used an illegal drug or used a prescription medication for non-medical reasons? Never Filed at: 09/08/2024 1128                      Medical Decision Making  Given dose of keppra in ED. States he has more meds at home and doesn't need prescription. No seizure activity in ED . No signs of active withdrawal. Follow up and return precautions reviewed.    Risk  Prescription drug management.                 Disposition  Final diagnoses:   Seizure (HCC)     Time reflects when diagnosis was documented in both MDM as applicable and the Disposition within this note       Time User Action Codes Description Comment    9/8/2024 11:19 AM Hu Greer Add [R56.9] Seizure (HCC)           ED Disposition       ED Disposition   Discharge    Condition   Stable    Date/Time   Sun Sep 8, 2024 1118    Comment   Herbert Crystal discharge to police custody.                    Follow-up Information       Follow up With Specialties Details Why Contact Info Additional Information    [facility] In 1 week  [address] [place] [address] [phone] [facility], [address] [place] [address], [address], [place], [place], [id number] [phone]    [facility]   [address]  [facility] [address] [facility] [address] [phone] [facility], [address], [place] [address] [place], [place], [address] [phone]            Discharge Medication List as of 9/8/2024 11:23 AM        CONTINUE these medications which have NOT CHANGED    Details   levETIRAcetam (Keppra) 500 mg tablet Take 2 tablets (1,000 mg total) by mouth 2 (two) times a day, Starting Sat 2/25/2023, Until Mon 3/27/2023, Print             No discharge procedures on file.    PDMP Review       None            ED Provider  Electronically Signed by             Hu Greer DO  09/08/24 1139

## 2025-07-21 ENCOUNTER — HOSPITAL ENCOUNTER (EMERGENCY)
Facility: HOSPITAL | Age: 40
End: 2025-07-22
Attending: EMERGENCY MEDICINE
Payer: COMMERCIAL

## 2025-07-21 ENCOUNTER — APPOINTMENT (EMERGENCY)
Dept: CT IMAGING | Facility: HOSPITAL | Age: 40
End: 2025-07-21
Payer: COMMERCIAL

## 2025-07-21 DIAGNOSIS — Z91.148 NONCOMPLIANCE WITH MEDICATION REGIMEN: ICD-10-CM

## 2025-07-21 DIAGNOSIS — S06.5XAA TRAUMATIC SUBDURAL HEMATOMA (HCC): Primary | ICD-10-CM

## 2025-07-21 DIAGNOSIS — G40.919 BREAKTHROUGH SEIZURE (HCC): ICD-10-CM

## 2025-07-21 LAB
ALBUMIN SERPL BCG-MCNC: 4.1 G/DL (ref 3.5–5)
ALP SERPL-CCNC: 68 U/L (ref 34–104)
ALT SERPL W P-5'-P-CCNC: 20 U/L (ref 7–52)
ANION GAP SERPL CALCULATED.3IONS-SCNC: 25 MMOL/L (ref 4–13)
ANISOCYTOSIS BLD QL SMEAR: PRESENT
APAP SERPL-MCNC: <2 UG/ML (ref 10–20)
AST SERPL W P-5'-P-CCNC: 27 U/L (ref 13–39)
BASOPHILS # BLD MANUAL: 0.15 THOUSAND/UL (ref 0–0.1)
BASOPHILS NFR MAR MANUAL: 1 % (ref 0–1)
BILIRUB SERPL-MCNC: 0.3 MG/DL (ref 0.2–1)
BUN SERPL-MCNC: 14 MG/DL (ref 5–25)
BURR CELLS BLD QL SMEAR: PRESENT
CALCIUM SERPL-MCNC: 9.4 MG/DL (ref 8.4–10.2)
CHLORIDE SERPL-SCNC: 100 MMOL/L (ref 96–108)
CO2 SERPL-SCNC: 17 MMOL/L (ref 21–32)
CREAT SERPL-MCNC: 1.61 MG/DL (ref 0.6–1.3)
EOSINOPHIL # BLD MANUAL: 0.3 THOUSAND/UL (ref 0–0.4)
EOSINOPHIL NFR BLD MANUAL: 2 % (ref 0–6)
ERYTHROCYTE [DISTWIDTH] IN BLOOD BY AUTOMATED COUNT: 14.3 % (ref 11.6–15.1)
ETHANOL SERPL-MCNC: <10 MG/DL
GIANT PLATELETS BLD QL SMEAR: PRESENT
GLUCOSE SERPL-MCNC: 72 MG/DL (ref 65–140)
GLUCOSE SERPL-MCNC: 76 MG/DL (ref 65–140)
HCT VFR BLD AUTO: 47.2 % (ref 36.5–46.1)
HGB BLD-MCNC: 14.1 G/DL (ref 12–15.4)
LYMPHOCYTES # BLD AUTO: 54 % (ref 14–44)
LYMPHOCYTES # BLD AUTO: 8.12 THOUSAND/UL (ref 0.6–4.47)
MACROCYTES BLD QL AUTO: PRESENT
MCH RBC QN AUTO: 30 PG (ref 26.8–34.3)
MCHC RBC AUTO-ENTMCNC: 29.9 G/DL (ref 31.4–37.4)
MCV RBC AUTO: 100 FL (ref 82–98)
METAMYELOCYTE ABSOLUTE CT: 0.15 THOUSAND/UL (ref 0–0.1)
METAMYELOCYTES NFR BLD MANUAL: 1 % (ref 0–1)
MONOCYTES # BLD AUTO: 1.2 THOUSAND/UL (ref 0–1.22)
MONOCYTES NFR BLD: 8 % (ref 4–12)
NEUTROPHILS # BLD MANUAL: 5.11 THOUSAND/UL (ref 1.85–7.62)
NEUTS BAND NFR BLD MANUAL: 1 % (ref 0–8)
NEUTS SEG NFR BLD AUTO: 33 % (ref 43–75)
OVALOCYTES BLD QL SMEAR: PRESENT
PLATELET # BLD AUTO: 294 THOUSANDS/UL (ref 149–390)
PLATELET BLD QL SMEAR: ADEQUATE
PMV BLD AUTO: 10.5 FL (ref 8.9–12.7)
POIKILOCYTOSIS BLD QL SMEAR: PRESENT
POLYCHROMASIA BLD QL SMEAR: PRESENT
POTASSIUM SERPL-SCNC: 4.6 MMOL/L (ref 3.5–5.3)
PROT SERPL-MCNC: 6.5 G/DL (ref 6.4–8.4)
RBC # BLD AUTO: 4.7 MILLION/UL (ref 3.88–5.12)
RBC MORPH BLD: PRESENT
SALICYLATES SERPL-MCNC: <5 MG/DL (ref 5–20)
SMUDGE CELLS BLD QL SMEAR: PRESENT
SODIUM SERPL-SCNC: 142 MMOL/L (ref 135–147)
VALPROATE SERPL-MCNC: <10 ÂΜG/ML (ref 50–125)
WBC # BLD AUTO: 15.03 THOUSAND/UL (ref 4.31–10.16)

## 2025-07-21 PROCEDURE — 71260 CT THORAX DX C+: CPT

## 2025-07-21 PROCEDURE — 80179 DRUG ASSAY SALICYLATE: CPT

## 2025-07-21 PROCEDURE — 85007 BL SMEAR W/DIFF WBC COUNT: CPT

## 2025-07-21 PROCEDURE — 99285 EMERGENCY DEPT VISIT HI MDM: CPT

## 2025-07-21 PROCEDURE — 80053 COMPREHEN METABOLIC PANEL: CPT

## 2025-07-21 PROCEDURE — 82077 ASSAY SPEC XCP UR&BREATH IA: CPT

## 2025-07-21 PROCEDURE — 96375 TX/PRO/DX INJ NEW DRUG ADDON: CPT

## 2025-07-21 PROCEDURE — 80143 DRUG ASSAY ACETAMINOPHEN: CPT

## 2025-07-21 PROCEDURE — 36415 COLL VENOUS BLD VENIPUNCTURE: CPT

## 2025-07-21 PROCEDURE — 85027 COMPLETE CBC AUTOMATED: CPT

## 2025-07-21 PROCEDURE — 70450 CT HEAD/BRAIN W/O DYE: CPT

## 2025-07-21 PROCEDURE — 82948 REAGENT STRIP/BLOOD GLUCOSE: CPT

## 2025-07-21 PROCEDURE — 96374 THER/PROPH/DIAG INJ IV PUSH: CPT

## 2025-07-21 PROCEDURE — 93005 ELECTROCARDIOGRAM TRACING: CPT

## 2025-07-21 PROCEDURE — 72125 CT NECK SPINE W/O DYE: CPT

## 2025-07-21 PROCEDURE — 80164 ASSAY DIPROPYLACETIC ACD TOT: CPT

## 2025-07-21 PROCEDURE — 74177 CT ABD & PELVIS W/CONTRAST: CPT

## 2025-07-21 RX ORDER — VALPROATE SODIUM 100 MG/ML
INJECTION, SOLUTION INTRAVENOUS
Status: COMPLETED
Start: 2025-07-21 | End: 2025-07-22

## 2025-07-21 RX ORDER — DIVALPROEX SODIUM 500 MG/1
500 TABLET, DELAYED RELEASE ORAL EVERY 8 HOURS SCHEDULED
COMMUNITY
Start: 2025-03-18 | End: 2025-07-26

## 2025-07-21 RX ORDER — DIAZEPAM 10 MG/2ML
5 INJECTION, SOLUTION INTRAMUSCULAR; INTRAVENOUS ONCE
Status: COMPLETED | OUTPATIENT
Start: 2025-07-21 | End: 2025-07-21

## 2025-07-21 RX ORDER — ONDANSETRON 2 MG/ML
4 INJECTION INTRAMUSCULAR; INTRAVENOUS ONCE
Status: COMPLETED | OUTPATIENT
Start: 2025-07-21 | End: 2025-07-21

## 2025-07-21 RX ADMIN — ONDANSETRON 4 MG: 2 INJECTION INTRAMUSCULAR; INTRAVENOUS at 23:58

## 2025-07-21 RX ADMIN — IOHEXOL 100 ML: 350 INJECTION, SOLUTION INTRAVENOUS at 23:34

## 2025-07-21 RX ADMIN — DIAZEPAM 5 MG: 10 INJECTION, SOLUTION INTRAMUSCULAR; INTRAVENOUS at 22:49

## 2025-07-22 ENCOUNTER — APPOINTMENT (INPATIENT)
Dept: RADIOLOGY | Facility: HOSPITAL | Age: 40
DRG: 055 | End: 2025-07-22
Payer: COMMERCIAL

## 2025-07-22 ENCOUNTER — HOSPITAL ENCOUNTER (INPATIENT)
Facility: HOSPITAL | Age: 40
LOS: 4 days | Discharge: HOME | DRG: 055 | End: 2025-07-26
Attending: SURGERY | Admitting: SURGERY
Payer: COMMERCIAL

## 2025-07-22 ENCOUNTER — TELEPHONE (OUTPATIENT)
Dept: NEUROSURGERY | Facility: CLINIC | Age: 40
End: 2025-07-22

## 2025-07-22 VITALS
RESPIRATION RATE: 16 BRPM | TEMPERATURE: 97.6 F | HEART RATE: 90 BPM | SYSTOLIC BLOOD PRESSURE: 103 MMHG | WEIGHT: 159.61 LBS | DIASTOLIC BLOOD PRESSURE: 61 MMHG | OXYGEN SATURATION: 96 %
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
## 2025-07-22 DIAGNOSIS — F19.10 SUBSTANCE ABUSE (HCC): ICD-10-CM

## 2025-07-22 DIAGNOSIS — R56.9 SEIZURE (HCC): ICD-10-CM

## 2025-07-22 DIAGNOSIS — S06.5XAA SDH (SUBDURAL HEMATOMA) (HCC): ICD-10-CM

## 2025-07-22 PROBLEM — Z87.74 HISTORY OF ARTERIOVENOUS MALFORMATION (AVM): Status: ACTIVE | Noted: 2025-07-22

## 2025-07-22 LAB
ALBUMIN SERPL BCG-MCNC: 3.5 G/DL (ref 3.5–5)
ALP SERPL-CCNC: 55 U/L (ref 34–104)
ALT SERPL W P-5'-P-CCNC: 16 U/L (ref 7–52)
AMPHETAMINES SERPL QL SCN: POSITIVE
ANION GAP SERPL CALCULATED.3IONS-SCNC: 6 MMOL/L (ref 4–13)
ANION GAP SERPL CALCULATED.3IONS-SCNC: 7 MMOL/L (ref 4–13)
APTT PPP: 23 SECONDS (ref 23–34)
AST SERPL W P-5'-P-CCNC: 28 U/L (ref 13–39)
ATRIAL RATE: 85 BPM
ATRIAL RATE: 87 BPM
ATRIAL RATE: 97 BPM
BACTERIA UR QL AUTO: NORMAL /HPF
BARBITURATES UR QL: NEGATIVE
BASOPHILS # BLD AUTO: 0.05 THOUSANDS/ÂΜL (ref 0–0.1)
BASOPHILS NFR BLD AUTO: 0 % (ref 0–1)
BENZODIAZ UR QL: POSITIVE
BILIRUB SERPL-MCNC: 0.28 MG/DL (ref 0.2–1)
BILIRUB UR QL STRIP: NEGATIVE
BUN SERPL-MCNC: 10 MG/DL (ref 5–25)
BUN SERPL-MCNC: 7 MG/DL (ref 5–25)
CA-I BLD-SCNC: 1.06 MMOL/L (ref 1.12–1.32)
CALCIUM SERPL-MCNC: 7.7 MG/DL (ref 8.4–10.2)
CALCIUM SERPL-MCNC: 7.8 MG/DL (ref 8.4–10.2)
CHLORIDE SERPL-SCNC: 106 MMOL/L (ref 96–108)
CHLORIDE SERPL-SCNC: 108 MMOL/L (ref 96–108)
CK SERPL-CCNC: 555 U/L (ref 39–192)
CK SERPL-CCNC: 705 U/L (ref 39–192)
CLARITY UR: CLEAR
CO2 SERPL-SCNC: 26 MMOL/L (ref 21–32)
CO2 SERPL-SCNC: 28 MMOL/L (ref 21–32)
COCAINE UR QL: POSITIVE
COLOR UR: YELLOW
CREAT SERPL-MCNC: 0.87 MG/DL (ref 0.6–1.3)
CREAT SERPL-MCNC: 1.26 MG/DL (ref 0.6–1.3)
EOSINOPHIL # BLD AUTO: 0.13 THOUSAND/ÂΜL (ref 0–0.61)
EOSINOPHIL NFR BLD AUTO: 1 % (ref 0–6)
ERYTHROCYTE [DISTWIDTH] IN BLOOD BY AUTOMATED COUNT: 14.6 % (ref 11.6–15.1)
FENTANYL UR QL SCN: NEGATIVE
GLUCOSE SERPL-MCNC: 124 MG/DL (ref 65–140)
GLUCOSE SERPL-MCNC: 91 MG/DL (ref 65–140)
GLUCOSE UR STRIP-MCNC: NEGATIVE MG/DL
HCT VFR BLD AUTO: 39.2 % (ref 36.5–46.1)
HGB BLD-MCNC: 12.7 G/DL (ref 12–15.4)
HGB UR QL STRIP.AUTO: 25
HYDROCODONE UR QL SCN: NEGATIVE
IMM GRANULOCYTES # BLD AUTO: 0.09 THOUSAND/UL (ref 0–0.2)
IMM GRANULOCYTES NFR BLD AUTO: 1 % (ref 0–2)
INR PPP: 0.89 (ref 0.85–1.19)
KETONES UR STRIP-MCNC: ABNORMAL MG/DL
LACTATE SERPL-SCNC: 1.3 MMOL/L (ref 0.5–2)
LACTATE SERPL-SCNC: 2.7 MMOL/L (ref 0.5–2)
LEUKOCYTE ESTERASE UR QL STRIP: NEGATIVE
LYMPHOCYTES # BLD AUTO: 2.45 THOUSANDS/ÂΜL (ref 0.6–4.47)
LYMPHOCYTES NFR BLD AUTO: 19 % (ref 14–44)
MAGNESIUM SERPL-MCNC: 2.2 MG/DL (ref 1.9–2.7)
MCH RBC QN AUTO: 30.5 PG (ref 26.8–34.3)
MCHC RBC AUTO-ENTMCNC: 32.4 G/DL (ref 31.4–37.4)
MCV RBC AUTO: 94 FL (ref 82–98)
METHADONE UR QL: NEGATIVE
MONOCYTES # BLD AUTO: 1.35 THOUSAND/ÂΜL (ref 0.17–1.22)
MONOCYTES NFR BLD AUTO: 10 % (ref 4–12)
NEUTROPHILS # BLD AUTO: 9.04 THOUSANDS/ÂΜL (ref 1.85–7.62)
NEUTS SEG NFR BLD AUTO: 69 % (ref 43–75)
NITRITE UR QL STRIP: NEGATIVE
NON-SQ EPI CELLS URNS QL MICRO: NORMAL /HPF
NRBC BLD AUTO-RTO: 0 /100 WBCS
OPIATES UR QL SCN: NEGATIVE
OXYCODONE+OXYMORPHONE UR QL SCN: NEGATIVE
P AXIS: 72 DEGREES
P AXIS: 73 DEGREES
P AXIS: 78 DEGREES
PCP UR QL: NEGATIVE
PH UR STRIP.AUTO: 6 [PH]
PHOSPHATE SERPL-MCNC: 2.9 MG/DL (ref 2.7–4.5)
PLATELET # BLD AUTO: 226 THOUSANDS/UL (ref 149–390)
PMV BLD AUTO: 10.4 FL (ref 8.9–12.7)
POTASSIUM SERPL-SCNC: 3.7 MMOL/L (ref 3.5–5.3)
POTASSIUM SERPL-SCNC: 4.7 MMOL/L (ref 3.5–5.3)
PR INTERVAL: 140 MS
PR INTERVAL: 144 MS
PR INTERVAL: 146 MS
PROT SERPL-MCNC: 5.4 G/DL (ref 6.4–8.4)
PROT UR STRIP-MCNC: ABNORMAL MG/DL
PROTHROMBIN TIME: 12.4 SECONDS (ref 12.3–15)
QRS AXIS: 80 DEGREES
QRS AXIS: 85 DEGREES
QRS AXIS: 85 DEGREES
QRSD INTERVAL: 86 MS
QRSD INTERVAL: 88 MS
QRSD INTERVAL: 88 MS
QT INTERVAL: 346 MS
QT INTERVAL: 362 MS
QT INTERVAL: 364 MS
QTC INTERVAL: 416 MS
QTC INTERVAL: 433 MS
QTC INTERVAL: 460 MS
RBC # BLD AUTO: 4.16 MILLION/UL (ref 3.88–5.12)
RBC #/AREA URNS AUTO: NORMAL /HPF
SODIUM SERPL-SCNC: 140 MMOL/L (ref 135–147)
SODIUM SERPL-SCNC: 141 MMOL/L (ref 135–147)
SP GR UR STRIP.AUTO: 1.02 (ref 1–1.04)
T WAVE AXIS: 64 DEGREES
T WAVE AXIS: 68 DEGREES
T WAVE AXIS: 76 DEGREES
THC UR QL: POSITIVE
UROBILINOGEN UA: NEGATIVE MG/DL
VENTRICULAR RATE: 85 BPM
VENTRICULAR RATE: 87 BPM
VENTRICULAR RATE: 97 BPM
WBC # BLD AUTO: 13.11 THOUSAND/UL (ref 4.31–10.16)
WBC #/AREA URNS AUTO: NORMAL /HPF

## 2025-07-22 PROCEDURE — 83605 ASSAY OF LACTIC ACID: CPT

## 2025-07-22 PROCEDURE — 85730 THROMBOPLASTIN TIME PARTIAL: CPT

## 2025-07-22 PROCEDURE — 73560 X-RAY EXAM OF KNEE 1 OR 2: CPT

## 2025-07-22 PROCEDURE — 84100 ASSAY OF PHOSPHORUS: CPT

## 2025-07-22 PROCEDURE — 82330 ASSAY OF CALCIUM: CPT

## 2025-07-22 PROCEDURE — 73030 X-RAY EXAM OF SHOULDER: CPT

## 2025-07-22 PROCEDURE — NC001 PR NO CHARGE: Performed by: SURGERY

## 2025-07-22 PROCEDURE — 96365 THER/PROPH/DIAG IV INF INIT: CPT

## 2025-07-22 PROCEDURE — 36415 COLL VENOUS BLD VENIPUNCTURE: CPT

## 2025-07-22 PROCEDURE — 93010 ELECTROCARDIOGRAM REPORT: CPT | Performed by: INTERNAL MEDICINE

## 2025-07-22 PROCEDURE — 93005 ELECTROCARDIOGRAM TRACING: CPT

## 2025-07-22 PROCEDURE — 70450 CT HEAD/BRAIN W/O DYE: CPT

## 2025-07-22 PROCEDURE — 80307 DRUG TEST PRSMV CHEM ANLYZR: CPT

## 2025-07-22 PROCEDURE — 99285 EMERGENCY DEPT VISIT HI MDM: CPT

## 2025-07-22 PROCEDURE — 99254 IP/OBS CNSLTJ NEW/EST MOD 60: CPT | Performed by: PHYSICIAN ASSISTANT

## 2025-07-22 PROCEDURE — 93010 ELECTROCARDIOGRAM REPORT: CPT

## 2025-07-22 PROCEDURE — 73070 X-RAY EXAM OF ELBOW: CPT

## 2025-07-22 PROCEDURE — 80053 COMPREHEN METABOLIC PANEL: CPT

## 2025-07-22 PROCEDURE — 99291 CRITICAL CARE FIRST HOUR: CPT | Performed by: EMERGENCY MEDICINE

## 2025-07-22 PROCEDURE — 85025 COMPLETE CBC W/AUTO DIFF WBC: CPT

## 2025-07-22 PROCEDURE — 96361 HYDRATE IV INFUSION ADD-ON: CPT

## 2025-07-22 PROCEDURE — 83735 ASSAY OF MAGNESIUM: CPT

## 2025-07-22 PROCEDURE — 99291 CRITICAL CARE FIRST HOUR: CPT | Performed by: SURGERY

## 2025-07-22 PROCEDURE — 81001 URINALYSIS AUTO W/SCOPE: CPT

## 2025-07-22 PROCEDURE — 80048 BASIC METABOLIC PNL TOTAL CA: CPT

## 2025-07-22 PROCEDURE — 96375 TX/PRO/DX INJ NEW DRUG ADDON: CPT

## 2025-07-22 PROCEDURE — 82550 ASSAY OF CK (CPK): CPT

## 2025-07-22 PROCEDURE — 73130 X-RAY EXAM OF HAND: CPT

## 2025-07-22 PROCEDURE — 85610 PROTHROMBIN TIME: CPT

## 2025-07-22 RX ORDER — ACETAMINOPHEN 325 MG/1
975 TABLET ORAL EVERY 6 HOURS PRN
Status: DISCONTINUED | OUTPATIENT
Start: 2025-07-22 | End: 2025-07-26 | Stop reason: HOSPADM

## 2025-07-22 RX ORDER — SODIUM CHLORIDE, SODIUM GLUCONATE, SODIUM ACETATE, POTASSIUM CHLORIDE, MAGNESIUM CHLORIDE, SODIUM PHOSPHATE, DIBASIC, AND POTASSIUM PHOSPHATE .53; .5; .37; .037; .03; .012; .00082 G/100ML; G/100ML; G/100ML; G/100ML; G/100ML; G/100ML; G/100ML
100 INJECTION, SOLUTION INTRAVENOUS CONTINUOUS
Status: DISCONTINUED | OUTPATIENT
Start: 2025-07-22 | End: 2025-07-22

## 2025-07-22 RX ORDER — SODIUM CHLORIDE, SODIUM GLUCONATE, SODIUM ACETATE, POTASSIUM CHLORIDE, MAGNESIUM CHLORIDE, SODIUM PHOSPHATE, DIBASIC, AND POTASSIUM PHOSPHATE .53; .5; .37; .037; .03; .012; .00082 G/100ML; G/100ML; G/100ML; G/100ML; G/100ML; G/100ML; G/100ML
1000 INJECTION, SOLUTION INTRAVENOUS ONCE
Status: COMPLETED | OUTPATIENT
Start: 2025-07-22 | End: 2025-07-22

## 2025-07-22 RX ORDER — CALCIUM GLUCONATE 20 MG/ML
2 INJECTION, SOLUTION INTRAVENOUS ONCE
Status: COMPLETED | OUTPATIENT
Start: 2025-07-22 | End: 2025-07-22

## 2025-07-22 RX ORDER — ENOXAPARIN SODIUM 100 MG/ML
30 INJECTION SUBCUTANEOUS EVERY 12 HOURS SCHEDULED
Status: DISCONTINUED | OUTPATIENT
Start: 2025-07-22 | End: 2025-07-26 | Stop reason: HOSPADM

## 2025-07-22 RX ORDER — DIVALPROEX SODIUM 500 MG/1
500 TABLET, DELAYED RELEASE ORAL EVERY 8 HOURS SCHEDULED
Status: DISCONTINUED | OUTPATIENT
Start: 2025-07-22 | End: 2025-07-22

## 2025-07-22 RX ORDER — BISACODYL 10 MG
10 SUPPOSITORY, RECTAL RECTAL DAILY PRN
Status: DISCONTINUED | OUTPATIENT
Start: 2025-07-22 | End: 2025-07-26 | Stop reason: HOSPADM

## 2025-07-22 RX ORDER — LEVETIRACETAM 500 MG/5ML
500 INJECTION, SOLUTION, CONCENTRATE INTRAVENOUS EVERY 12 HOURS SCHEDULED
Status: DISCONTINUED | OUTPATIENT
Start: 2025-07-22 | End: 2025-07-22

## 2025-07-22 RX ORDER — LEVETIRACETAM 500 MG/5ML
1000 INJECTION, SOLUTION, CONCENTRATE INTRAVENOUS ONCE
Status: COMPLETED | OUTPATIENT
Start: 2025-07-22 | End: 2025-07-22

## 2025-07-22 RX ORDER — FENTANYL CITRATE 50 UG/ML
50 INJECTION, SOLUTION INTRAMUSCULAR; INTRAVENOUS ONCE
Refills: 0 | Status: COMPLETED | OUTPATIENT
Start: 2025-07-22 | End: 2025-07-22

## 2025-07-22 RX ORDER — PANTOPRAZOLE SODIUM 40 MG/10ML
40 INJECTION, POWDER, LYOPHILIZED, FOR SOLUTION INTRAVENOUS
Status: DISCONTINUED | OUTPATIENT
Start: 2025-07-22 | End: 2025-07-22

## 2025-07-22 RX ORDER — CHLORHEXIDINE GLUCONATE ORAL RINSE 1.2 MG/ML
15 SOLUTION DENTAL EVERY 12 HOURS SCHEDULED
Status: DISCONTINUED | OUTPATIENT
Start: 2025-07-22 | End: 2025-07-26 | Stop reason: HOSPADM

## 2025-07-22 RX ADMIN — VALPROATE SODIUM 1448 MG: 500 INJECTION INTRAVENOUS at 00:16

## 2025-07-22 RX ADMIN — SODIUM CHLORIDE, SODIUM GLUCONATE, SODIUM ACETATE, POTASSIUM CHLORIDE, MAGNESIUM CHLORIDE, SODIUM PHOSPHATE, DIBASIC, AND POTASSIUM PHOSPHATE 100 ML/HR: .53; .5; .37; .037; .03; .012; .00082 INJECTION, SOLUTION INTRAVENOUS at 05:24

## 2025-07-22 RX ADMIN — CALCIUM GLUCONATE 2 G: 20 INJECTION, SOLUTION INTRAVENOUS at 04:07

## 2025-07-22 RX ADMIN — PANTOPRAZOLE SODIUM 40 MG: 40 INJECTION, POWDER, FOR SOLUTION INTRAVENOUS at 09:32

## 2025-07-22 RX ADMIN — LEVETIRACETAM 1000 MG: 100 INJECTION, SOLUTION INTRAVENOUS at 00:54

## 2025-07-22 RX ADMIN — DIVALPROEX SODIUM 500 MG: 500 TABLET, DELAYED RELEASE ORAL at 13:09

## 2025-07-22 RX ADMIN — LEVETIRACETAM 500 MG: 100 INJECTION, SOLUTION INTRAVENOUS at 09:32

## 2025-07-22 RX ADMIN — VALPROATE SODIUM 1448 MG: 100 INJECTION, SOLUTION INTRAVENOUS at 00:19

## 2025-07-22 RX ADMIN — SODIUM CHLORIDE 1000 ML: 0.9 INJECTION, SOLUTION INTRAVENOUS at 01:16

## 2025-07-22 RX ADMIN — CHLORHEXIDINE GLUCONATE 15 ML: 1.2 SOLUTION ORAL at 09:32

## 2025-07-22 RX ADMIN — SODIUM CHLORIDE, SODIUM GLUCONATE, SODIUM ACETATE, POTASSIUM CHLORIDE, MAGNESIUM CHLORIDE, SODIUM PHOSPHATE, DIBASIC, AND POTASSIUM PHOSPHATE 1000 ML: .53; .5; .37; .037; .03; .012; .00082 INJECTION, SOLUTION INTRAVENOUS at 03:09

## 2025-07-22 RX ADMIN — DIVALPROEX SODIUM 750 MG: 500 TABLET, DELAYED RELEASE ORAL at 22:05

## 2025-07-22 RX ADMIN — FENTANYL CITRATE 50 MCG: 50 INJECTION, SOLUTION INTRAMUSCULAR; INTRAVENOUS at 01:16

## 2025-07-22 RX ADMIN — ENOXAPARIN SODIUM 30 MG: 100 INJECTION SUBCUTANEOUS at 20:06

## 2025-07-22 RX ADMIN — DIVALPROEX SODIUM 500 MG: 500 TABLET, DELAYED RELEASE ORAL at 06:29

## 2025-07-22 NOTE — ED PROVIDER NOTES
Time reflects when diagnosis was documented in both MDM as applicable and the Disposition within this note       Time User Action Codes Description Comment    7/22/2025 12:42 AM Aster Villegas Add [G40.919] Breakthrough seizure (HCC)     7/22/2025 12:42 AM Aster Villegas Add [Z91.148] Noncompliance with medication regimen     7/22/2025 12:43 AM Aster Villegas Add [S06.5XAA] Traumatic subdural hematoma (HCC)     7/22/2025  1:07 AM Aster Villegas Modify [G40.919] Breakthrough seizure (HCC)     7/22/2025  1:07 AM Aster Villegas Modify [S06.5XAA] Traumatic subdural hematoma (HCC)           ED Disposition       ED Disposition   Transfer to Another Facility-In Network    Condition   --    Date/Time   Tue Jul 22, 2025 12:43 AM    Comment   Herbert Crystal should be transferred out to [facility].               Assessment & Plan   {Hyperlinks  Risk Stratification - NIHSS - HEART SCORE - Fill out sepsis note and make sure you call [id number] if severe or septic shock:[id number]}      Medical Decision Making  Amount and/or Complexity of Data Reviewed  Labs: ordered. Decision-making details documented in ED Course.  Radiology: ordered. Decision-making details documented in ED Course.    Risk  Prescription drug management.        ED Course as of 07/22/25 0847   Mon Jul 21, 2025   2306 WBC(!): 15.03   2306 Hemoglobin: 14.1   2306 Hematocrit(!): 47.2   2306 Platelet Count: 294   2337 VALPROIC ACID TOTAL(!): <10   Tue Jul 22, 2025   0043 CT head without contrast  IMPRESSION:  1. Left lateral frontal hyperdense subdural collection measuring 6 mm in thickness (coronal image 34, series 5), most consistent with acute subdural hematoma. This collection was previously low density on CT head 02/25/2023. No significant mass effect.  2. 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   0044 AMPH/METH(!): Positive   0044 COCAINE URINE(!): Positive   0044 THC URINE(!): Positive   0044 BENZODIAZEPINE  URINE(!): Positive  Given versed and valium for seizure activity   0045 Spoke with B trauma attending and patient is accepted for transfer. Current GCS is 14.       Medications   diazepam (VALIUM) injection 5 mg (5 mg Intravenous Given 7/21/25 2249)   ondansetron (ZOFRAN) injection 4 mg (4 mg Intravenous Given 7/21/25 2358)   iohexol (OMNIPAQUE) 350 MG/ML injection (SINGLE-DOSE) 100 mL (100 mL Intravenous Given 7/21/25 2334)   valproate (DEPACON) 1,448 mg in sodium chloride 0.9 % 50 mL BOLUS (0 mg Intravenous Stopped 7/22/25 0044)   valproate (DEPACON) 100 mg/mL injection **ADS Override Pull** (1,448 mg  Given 7/22/25 0016)   levETIRAcetam (KEPPRA) injection 1,000 mg (1,000 mg Intravenous Given 7/22/25 0054)   fentaNYL injection 50 mcg (50 mcg Intravenous Given 7/22/25 0116)   sodium chloride 0.9 % bolus 1,000 mL (1,000 mL Intravenous New Bag 7/22/25 0116)       ED Risk Strat Scores                    No data recorded        SBIRT 22yo+      Flowsheet Row Most Recent Value   Initial Alcohol Screen: US AUDIT-C     1. How often do you have a drink containing alcohol? 3 Filed at: 07/22/2025 0049   2. How many drinks containing alcohol do you have on a typical day you are drinking?  2 Filed at: 07/22/2025 0049   3a. Male UNDER 65: How often do you have five or more drinks on one occasion? 0 Filed at: 07/22/2025 0049   3b. FEMALE Any Age, or MALE 65+: How often do you have 4 or more drinks on one occassion? 0 Filed at: 07/22/2025 0049   Audit-C Score 5 Filed at: 07/22/2025 0049   LOUANN: How many times in the past year have you...    Used an illegal drug or used a prescription medication for non-medical reasons? Never Filed at: 07/22/2025 0049                            History of Present Illness   {Hyperlinks  History (Med, Surg, Fam, Social) - Current Medications - Allergies  :[id number]}    Chief Complaint   Patient presents with    Seizure - Prior Hx Of     Arrives via EMS after patient had seizure at home and is  postictal; family reports he is aggressive when he is postictal; has not had a seizure in a while per family. 10mg Versed IM given by EMS prior to arrival.        Past Medical History[1]   Past Surgical History[2]   Family History[3]   Social History[4]   E-Cigarette/Vaping      E-Cigarette/Vaping Substances    Nicotine No     THC No     CBD No     Flavoring No     Other No     Unknown No       I have reviewed and agree with the history as documented.       History provided by:  Medical records, parent and relative  History limited by:  Mental status change   used: No        Review of Systems   Unable to perform ROS: Mental status change   Skin:  Positive for wound.   Neurological:  Positive for seizures.   Psychiatric/Behavioral:  Positive for confusion.        Objective   {Hyperlinks  Historical Vitals - Historical Labs - Chart Review/Microbiology - Last Echo - Code Status  :[id number]}    ED Triage Vitals   Temperature Pulse Blood Pressure Respirations SpO2 Patient Position - Orthostatic VS   07/21/25 2301 07/21/25 2245 07/21/25 2245 07/21/25 2245 07/21/25 2245 07/21/25 2245   97.6 °F (36.4 °C) (!) 109 123/63 22 97 % Lying      Temp Source Heart Rate Source BP Location FiO2 (%) Pain Score    07/21/25 2301 07/21/25 2245 07/21/25 2245 -- 07/22/25 0116    Oral Monitor Left arm  10 - Worst Possible Pain      Vitals      Date and Time Temp Pulse SpO2 Resp BP Pain Score FACES Pain Rating User   07/22/25 0211 -- 90 96 % 16 103/61 -- -- EY 07/22/25 0142 -- -- -- -- -- 10 - Worst Possible Pain -- EY 07/22/25 0130 -- 82 98 % 15 128/64 -- -- EY 07/22/25 0116 -- -- -- -- -- 10 - Worst Possible Pain -- EY 07/22/25 0106 -- 85 97 % 18 118/64 -- -- EY 07/22/25 0100 -- 95 98 % 18 85/52 -- -- EY 07/22/25 0030 -- 86 97 % 18 109/71 -- -- EY 07/22/25 0000 -- 92 99 % 18 120/73 -- -- EY 07/21/25 2305 -- 93 97 % 18 123/66 -- -- EY 07/21/25 2301 97.6 °F (36.4 °C) -- -- -- -- -- -- EY 07/21/25 3900  -- 109 97 % 22 123/63 -- -- RB            Physical Exam  Constitutional:       General: He is in acute distress.      Appearance: He is diaphoretic.      Interventions: He is restrained.      Comments: Patient is altered and combative.  Soft restraints placed for patient and staff safety.   HENT:      Head: Normocephalic. No raccoon eyes, Chow's sign, abrasion or contusion.      Jaw: There is normal jaw occlusion.      Right Ear: Tympanic membrane, ear canal and external ear normal. No hemotympanum.      Left Ear: Tympanic membrane, ear canal and external ear normal. No hemotympanum.      Nose: No nasal deformity.      Right Nostril: No epistaxis or septal hematoma.      Left Nostril: No epistaxis or septal hematoma.      Mouth/Throat:      Comments: Blood noted on the patient's lips and teeth.  No apparent dental injury.  Unable to assess tongue at this time.  ***    Eyes:      General: Lids are normal. Gaze aligned appropriately.      Conjunctiva/sclera: Conjunctivae normal.      Pupils: Pupils are equal.      Right eye: Pupil is round and reactive.      Left eye: Pupil is round and reactive.      Comments: Pupils are 4 mm and reactive bilaterally.     Cardiovascular:      Rate and Rhythm: Regular rhythm. Tachycardia present.      Heart sounds: S1 normal and S2 normal. No murmur heard.     No friction rub. No gallop.   Pulmonary:      Effort: Pulmonary effort is normal. No respiratory distress.      Breath sounds: Normal breath sounds and air entry. No stridor or transmitted upper airway sounds. No wheezing, rhonchi or rales.   Chest:      Chest wall: No deformity, swelling, tenderness, crepitus or edema.   Abdominal:      General: Abdomen is flat. Bowel sounds are normal. There is no distension.      Palpations: Abdomen is soft. There is no mass.     Musculoskeletal:      Cervical back: Normal range of motion and neck supple. No edema, erythema or torticollis.      Comments: Patient thrashing around and moving  all extremities without difficulty   Lymphadenopathy:      Cervical: No cervical adenopathy.     Skin:     Capillary Refill: Capillary refill takes less than 2 seconds.      Coloration: Skin is not pale.      Findings: No abrasion, bruising, ecchymosis, rash or wound.     Neurological:      Mental Status: He is lethargic, disoriented and confused.      GCS: GCS eye subscore is 2. GCS verbal subscore is 2. GCS motor subscore is 5.     Psychiatric:         Behavior: Behavior is uncooperative and combative.         Results Reviewed       Procedure Component Value Units Date/Time    Rapid drug screen, urine [[id number]]  (Abnormal) Collected: 07/22/25 0011    Lab Status: Final result Specimen: Urine, Clean Catch Updated: 07/22/25 0037     Amph/Meth UR Positive     Barbiturate Ur Negative     Benzodiazepine Urine Positive     Cocaine Urine Positive     Methadone Urine Negative     Opiate Urine Negative     PCP Ur Negative     THC Urine Positive     Oxycodone Urine Negative     Fentanyl Urine Negative     HYDROCODONE URINE Negative    Narrative:      Presumptive report. If requested, specimen will be sent to reference lab for confirmation.  FOR MEDICAL PURPOSES ONLY.   IF CONFIRMATION NEEDED PLEASE CONTACT THE LAB WITHIN 5 DAYS.    Drug Screen Cutoff Levels:  AMPHETAMINE/METHAMPHETAMINES  1000 ng/mL  BARBITURATES     200 ng/mL  BENZODIAZEPINES     200 ng/mL  COCAINE      300 ng/mL  METHADONE      300 ng/mL  OPIATES      300 ng/mL  PHENCYCLIDINE     25 ng/mL  THC       50 ng/mL  OXYCODONE      100 ng/mL  FENTANYL      5 ng/mL  HYDROCODONE     300 ng/mL    Urine Microscopic [[id number]]  (Normal) Collected: 07/22/25 0011    Lab Status: Final result Specimen: Urine, Clean Catch Updated: 07/22/25 0026     RBC, UA 1-2 /hpf      WBC, UA None Seen /hpf      Epithelial Cells Occasional /hpf      Bacteria, UA Occasional /hpf     UA (URINE) with reflex to Scope [[id number]]  (Abnormal) Collected: 07/22/25 0011    Lab Status: Final  result Specimen: Urine, Clean Catch Updated: 07/22/25 0026     Color, UA Yellow     Clarity, UA Clear     Specific Gravity, UA 1.020     pH, UA 6.0     Leukocytes, UA Negative     Nitrite, UA Negative     Protein, UA 30 (1+) mg/dl      Glucose, UA Negative mg/dl      Ketones, UA 5 (Trace) mg/dl      Bilirubin, UA Negative     Occult Blood, UA 25.0     UROBILINOGEN UA Negative mg/dL     Manual Differential(PHLEBS Do Not Order) [[id number]]  (Abnormal) Collected: 07/21/25 2251    Lab Status: Final result Specimen: Blood from Arm, Right Updated: 07/21/25 2342     Segmented % 33 %      Bands % 1 %      Lymphocytes % 54 %      Monocytes % 8 %      Eosinophils % 2 %      Basophils % 1 %      Metamyelocytes % 1 %      Absolute Neutrophils 5.11 Thousand/uL      Absolute Lymphocytes 8.12 Thousand/uL      Absolute Monocytes 1.20 Thousand/uL      Absolute Eosinophils 0.30 Thousand/uL      Absolute Basophils 0.15 Thousand/uL      Absolute Metamyelocytes 0.15 Thousand/uL      Total Counted --     Smudge Cells Present     RBC Morphology Present     Platelet Estimate Adequate     Giant PLTs Present     Anisocytosis Present     Alyssa Cells Present     Macrocytes Present     Ovalocytes Present     Poikilocytes Present     Polychromasia Present    Valproic acid level, total [[id number]]  (Abnormal) Collected: 07/21/25 2251    Lab Status: Final result Specimen: Blood from Arm, Right Updated: 07/21/25 2326     Valproic Acid, Total <10 µg/mL     Ethanol [[id number]]  (Normal) Collected: 07/21/25 2251    Lab Status: Final result Specimen: Blood from Arm, Right Updated: 07/21/25 2324     Ethanol Lvl <10 mg/dL     Comprehensive metabolic panel [[id number]]  (Abnormal) Collected: 07/21/25 2251    Lab Status: Final result Specimen: Blood from Arm, Right Updated: 07/21/25 2323     Sodium 142 mmol/L      Potassium 4.6 mmol/L      Chloride 100 mmol/L      CO2 17 mmol/L      ANION GAP 25 mmol/L      BUN 14 mg/dL      Creatinine 1.61 mg/dL       Glucose 76 mg/dL      Calcium 9.4 mg/dL      AST 27 U/L      ALT 20 U/L      Alkaline Phosphatase 68 U/L      Total Protein 6.5 g/dL      Albumin 4.1 g/dL      Total Bilirubin 0.30 mg/dL      eGFR --    Narrative:      Notes:     1. eGFR calculation is only valid for adults 18 years and older.  2. EGFR calculation cannot be performed for patients who are transgender, non-binary, or whose legal sex, sex at birth, and gender identity differ.    Salicylate level [[id number]]  (Abnormal) Collected: 07/21/25 2251    Lab Status: Final result Specimen: Blood from Arm, Right Updated: 07/21/25 2323     Salicylate Lvl <5 mg/dL     Acetaminophen level-If concentration is detectable, please discuss with medical  on call. [[id number]]  (Abnormal) Collected: 07/21/25 2251    Lab Status: Final result Specimen: Blood from Arm, Right Updated: 07/21/25 2323     Acetaminophen Level <2 ug/mL     CBC and differential [[id number]]  (Abnormal) Collected: 07/21/25 2251    Lab Status: Final result Specimen: Blood from Arm, Right Updated: 07/21/25 2311     WBC 15.03 Thousand/uL      RBC 4.70 Million/uL      Hemoglobin 14.1 g/dL      Hematocrit 47.2 %       fL      MCH 30.0 pg      MCHC 29.9 g/dL      RDW 14.3 %      MPV 10.5 fL      Platelets 294 Thousands/uL     Fingerstick Glucose (POCT) [[id number]]  (Normal) Collected: 07/21/25 2301    Lab Status: Final result Specimen: Blood Updated: 07/21/25 2302     POC Glucose 72 mg/dl             CT recon only thoracolumbar   Final Interpretation by Mark Denton DO (07/22 0204)      No evidence of acute spinal injury.      Other findings as above.      Resident: JOANNE PAZ I, the attending radiologist, have reviewed the images and agree with the final report above.      Workstation performed: [id number]         CT chest abdomen pelvis w contrast   Final Interpretation by Mark Denton DO (07/22 0201)      No evidence of acute traumatic injury or acute  process in the chest, abdomen, or pelvis.      Other findings as above.            Resident: JOANNE PAZ I, the attending radiologist, have reviewed the images and agree with the final report above.      Workstation performed: [id number]         CT head without contrast   Final Interpretation by Mark Denton DO (07/22 0025)      Left lateral frontal hyperdense subdural collection measuring 6 mm in thickness (coronal image 34, series 5), most consistent with acute subdural hematoma. This collection was previously low density on CT head 02/25/2023. No significant mass effect.      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      The study was marked in EPIC for immediate notification.      Follow-up recommended.            Resident: JOANNE PAZ I, the attending radiologist, have reviewed the images and agree with the final report above.      Workstation performed: [id number]         CT cervical spine without contrast   Final Interpretation by Mark Denton DO (07/22 0029)      No evidence of acute cervical spine injury.      Other findings as above.                  Resident: JOANNE PAZ I, the attending radiologist, have reviewed the images and agree with the final report above.      Workstation performed: [id number]             ECG 12 Lead Documentation Only    Date/Time: 7/21/2025 10:56 PM    Performed by: Aster Villegas PA-C  Authorized by: Aster Villegas PA-C    ECG reviewed by me, the ED Provider: yes    Patient location:  ED  Interpretation:     Interpretation: normal    Rate:     ECG rate:  97    ECG rate assessment: normal    Rhythm:     Rhythm: sinus rhythm    Ectopy:     Ectopy: none    QRS:     QRS axis:  Normal  Conduction:     Conduction: normal    ST segments:     ST segments:  Normal  T waves:     T waves: normal    Comments:      WI interval: 144ms  QRS duration: 88ms  QT/QTc: 362/459ms      ED  Medication and Procedure Management   Prior to Admission Medications   Prescriptions Last Dose Informant Patient Reported? Taking?   divalproex sodium (DEPAKOTE) 500 mg DR tablet   Yes Yes   Sig: Take 500 mg by mouth every 8 (eight) hours      Facility-Administered Medications: None     Discharge Medication List as of 7/22/2025  2:22 AM        CONTINUE these medications which have NOT CHANGED    Details   divalproex sodium (DEPAKOTE) 500 mg DR tablet Take 500 mg by mouth every 8 (eight) hours, Starting Tue 3/18/2025, Historical Med           No discharge procedures on file.  ED SEPSIS DOCUMENTATION   Time reflects when diagnosis was documented in both MDM as applicable and the Disposition within this note       Time User Action Codes Description Comment    7/22/2025 12:42 AM Aster Villegas Add [G40.919] Breakthrough seizure (HCC)     7/22/2025 12:42 AM Aster Villegas Add [Z91.148] Noncompliance with medication regimen     7/22/2025 12:43 AM Aster Villegas Add [S06.5XAA] Traumatic subdural hematoma (HCC)     7/22/2025  1:07 AM Aster Villegas Modify [G40.919] Breakthrough seizure (HCC)     7/22/2025  1:07 AM Aster Villegas Modify [S06.5XAA] Traumatic subdural hematoma (HCC)                    [1]   Past Medical History:  Diagnosis Date    Seizure (HCC)    [2]   Past Surgical History:  Procedure Laterality Date    BRAIN SURGERY     [3] No family history on file.  [4]   Social History  Tobacco Use    Smoking status: Every Day     Current packs/day: 0.25     Types: Cigarettes    Smokeless tobacco: Never   Substance Use Topics    Alcohol use: Yes     Comment: occasional    Drug use: Yes     Types: Marijuana     Comment: 1/8th every 2-3days-medical card      7/21/2025 10:56 PM    Performed by: Aster Villegas PA-C  Authorized by: Aster Villegas PA-C    ECG reviewed by me, the ED Provider: yes    Patient location:  ED  Interpretation:     Interpretation: normal    Rate:     ECG rate:  97    ECG rate assessment: normal    Rhythm:     Rhythm: sinus rhythm    Ectopy:     Ectopy: none    QRS:     QRS axis:  Normal  Conduction:     Conduction: normal    ST segments:     ST segments:  Normal  T waves:     T waves: normal    Comments:      WA interval: 144ms  QRS duration: 88ms  QT/QTc: 362/459ms      Critical Care Time Statement: Upon my evaluation, this patient had a high probability of imminent or life-threatening deterioration due to seizures and SDH, which required my direct attention, intervention, and personal management.  I spent a total of 45 minutes directly providing critical care services, including interpretation of complex medical databases, evaluating for the presence of life-threatening injuries or illnesses, complex medical decision making (to support/prevent further life-threatening deterioration)., interpretation of hemodynamic data, and consulting with the trauma service at [facility]. This time is exclusive of procedures, teaching, treating other patients, family meetings, and any prior time recorded by providers other than myself.         ED Medication and Procedure Management   Prior to Admission Medications   Prescriptions Last Dose Informant Patient Reported? Taking?   divalproex sodium (DEPAKOTE) 500 mg DR tablet   Yes Yes   Sig: Take 500 mg by mouth every 8 (eight) hours      Facility-Administered Medications: None     Discharge Medication List as of 7/22/2025  2:22 AM        CONTINUE these medications which have NOT CHANGED    Details   divalproex sodium (DEPAKOTE) 500 mg DR tablet Take 500 mg by mouth every 8 (eight) hours, Starting Tue 3/18/2025, Historical Med           No discharge procedures on file.  ED SEPSIS DOCUMENTATION    Time reflects when diagnosis was documented in both MDM as applicable and the Disposition within this note       Time User Action Codes Description Comment    7/22/2025 12:42 AM Aster Villegas Add [G40.919] Breakthrough seizure (HCC)     7/22/2025 12:42 AM Aster Villegas Add [Z91.148] Noncompliance with medication regimen     7/22/2025 12:43 AM Aster Villegas Add [S06.5XAA] Traumatic subdural hematoma (HCC)     7/22/2025  1:07 AM Aster Villegas Modify [G40.919] Breakthrough seizure (HCC)     7/22/2025  1:07 AM Aster Villegas Modify [S06.5XAA] Traumatic subdural hematoma (HCC)                      [1]   Past Medical History:  Diagnosis Date    Seizure (HCC)    [2]   Past Surgical History:  Procedure Laterality Date    BRAIN SURGERY     [3] No family history on file.  [4]   Social History  Tobacco Use    Smoking status: Every Day     Current packs/day: 0.25     Types: Cigarettes    Smokeless tobacco: Never   Substance Use Topics    Alcohol use: Yes     Comment: occasional    Drug use: Yes     Types: Marijuana     Comment: 1/8th every 2-3days-medical card        Aster Villegas PA-C  08/07/25 0329

## 2025-07-22 NOTE — ED NOTES
Patient awake, irritable with some care measures. Is oriented. States that his head hurts. Does follow instructions.     Meseret Paris RN  07/22/25 0132

## 2025-07-22 NOTE — ED NOTES
Patient initially required 4 point soft restraints d/t movements and inability to follow commands. When patient went to CT, soft restraints were removed and patient tolerated well and no longer needs soft restraints. Provider aware.      Cristiano Engel RN  07/21/25 7969

## 2025-07-22 NOTE — TELEPHONE ENCOUNTER
7/22/25 - PT IN  ED DEPT  8/11/25 2 WK HFU W/CT HEAD     [facility]  Please arrange 2-week follow-up with MADELEINE with repeat CT head.  Thank you.

## 2025-07-22 NOTE — EMTALA/ACUTE CARE TRANSFER
[facility] EMERGENCY DEPARTMENT  [address] [address]  [phone]  Dept: [phone]      EMTALA TRANSFER CONSENT    NAME Herbert Crystal                                         1985                              MRN [id number]    I have been informed of my rights regarding examination, treatment, and transfer   by Dr. Daly smith. providers found    Benefits: Specialized equipment and/or services available at the receiving facility (Include comment)________________________ (trauma; neurosurgery; critical care)    Risks: Potential for delay in receiving treatment, Loss of IV, Increased discomfort during transfer, Potential deterioration of medical condition      Transfer Request   I acknowledge that my medical condition has been evaluated and explained to me by the emergency department physician or other qualified medical person and/or my attending physician who has recommended and offered to me further medical examination and treatment. I understand the Hospital's obligation with respect to the treatment and stabilization of my emergency medical condition. I nevertheless request to be transferred. I release the Hospital, the doctor, and any other persons caring for me from all responsibility or liability for any injury or ill effects that may result from my transfer and agree to accept all responsibility for the consequences of my choice to transfer, rather than receive stabilizing treatment at the Hospital. I understand that because the transfer is my request, my insurance may not provide reimbursement for the services.  The Hospital will assist and direct me and my family in how to make arrangements for transfer, but the hospital is not liable for any fees charged by the transport service.  In spite of this understanding, I refuse to consent to further medical examination and treatment which has been offered to me, and request transfer to Accepting Facility  Name, City & State : [facility]. I authorize the performance of emergency medical procedures and treatments upon me in both transit and upon arrival at the receiving facility.  Additionally, I authorize the release of any and all medical records to the receiving facility and request they be transported with me, if possible.    I authorize the performance of emergency medical procedures and treatments upon me in both transit and upon arrival at the receiving facility.  Additionally, I authorize the release of any and all medical records to the receiving facility and request they be transported with me, if possible.  I understand that the safest mode of transportation during a medical emergency is an ambulance and that the Hospital advocates the use of this mode of transport. Risks of traveling to the receiving facility by car, including absence of medical control, life sustaining equipment, such as oxygen, and medical personnel has been explained to me and I fully understand them.    (LOUISE CORRECT BOX BELOW)  [  ]  I consent to the stated transfer and to be transported by ambulance/helicopter.  [  ]  I consent to the stated transfer, but refuse transportation by ambulance and accept full responsibility for my transportation by car.  I understand the risks of non-ambulance transfers and I exonerate the Hospital and its staff from any deterioration in my condition that results from this refusal.    X___________________________________________    DATE  25  TIME________  Signature of patient or legally responsible individual signing on patient behalf           RELATIONSHIP TO PATIENT_________________________          Provider Certification    NAME Herbert De Jesus Bonilla                                        [facility] 1985                              MRN [id number]    A medical screening exam was performed on the above named patient.  Based on the examination:    Condition Necessitating Transfer The  primary encounter diagnosis was Traumatic subdural hematoma (HCC). Diagnoses of Breakthrough seizure (HCC) and Noncompliance with medication regimen were also pertinent to this visit.    Patient Condition: The patient has been stabilized such that within reasonable medical probability, no material deterioration of the patient condition or the condition of the unborn child(emma) is likely to result from the transfer    Reason for Transfer: Level of Care needed not available at this facility    Transfer Requirements: Facility [facility]   Space available and qualified personnel available for treatment as acknowledged by    Agreed to accept transfer and to provide appropriate medical treatment as acknowledged by       Dr. Mcrae  Appropriate medical records of the examination and treatment of the patient are provided at the time of transfer   STAFF INITIAL WHEN COMPLETED _______  Transfer will be performed by qualified personnel from    and appropriate transfer equipment as required, including the use of necessary and appropriate life support measures.    Provider Certification: I have examined the patient and explained the following risks and benefits of being transferred/refusing transfer to the patient/family:  General risk, such as traffic hazards, adverse weather conditions, rough terrain or turbulence, possible failure of equipment (including vehicle or aircraft), or consequences of actions of persons outside the control of the transport personnel, Unanticipated needs of medical equipment and personnel during transport, Risk of worsening condition, The possibility of a transport vehicle being unavailable      Based on these reasonable risks and benefits to the patient and/or the unborn child(emma), and based upon the information available at the time of the patient’s examination, I certify that the medical benefits reasonably to be expected from the provision of appropriate medical treatments at another  medical facility outweigh the increasing risks, if any, to the individual’s medical condition, and in the case of labor to the unborn child, from effecting the transfer.    X____________________________________________ DATE 07/22/25        TIME_______      ORIGINAL - SEND TO MEDICAL RECORDS   COPY - SEND WITH PATIENT DURING TRANSFER

## 2025-07-23 ENCOUNTER — APPOINTMENT (INPATIENT)
Dept: RADIOLOGY | Facility: HOSPITAL | Age: 40
DRG: 055 | End: 2025-07-23
Payer: COMMERCIAL

## 2025-07-23 ENCOUNTER — APPOINTMENT (INPATIENT)
Dept: RADIOLOGY | Facility: HOSPITAL | Age: 40
DRG: 055 | End: 2025-07-23
Attending: PHYSICIAN ASSISTANT
Payer: COMMERCIAL

## 2025-07-23 PROBLEM — R20.0 RIGHT SIDED NUMBNESS: Status: ACTIVE | Noted: 2025-07-23

## 2025-07-23 PROBLEM — R53.1 RIGHT SIDED WEAKNESS: Status: ACTIVE | Noted: 2025-07-23

## 2025-07-23 LAB
ANION GAP SERPL CALCULATED.3IONS-SCNC: 7 MMOL/L (ref 4–13)
BUN SERPL-MCNC: 9 MG/DL (ref 5–25)
CALCIUM SERPL-MCNC: 8.8 MG/DL (ref 8.4–10.2)
CHLORIDE SERPL-SCNC: 105 MMOL/L (ref 96–108)
CO2 SERPL-SCNC: 28 MMOL/L (ref 21–32)
CREAT SERPL-MCNC: 0.79 MG/DL (ref 0.6–1.3)
ERYTHROCYTE [DISTWIDTH] IN BLOOD BY AUTOMATED COUNT: 14.5 % (ref 11.6–15.1)
GLUCOSE SERPL-MCNC: 90 MG/DL (ref 65–140)
HCT VFR BLD AUTO: 42.6 % (ref 36.5–46.1)
HGB BLD-MCNC: 13.4 G/DL (ref 12–15.4)
MCH RBC QN AUTO: 30 PG (ref 26.8–34.3)
MCHC RBC AUTO-ENTMCNC: 31.5 G/DL (ref 31.4–37.4)
MCV RBC AUTO: 96 FL (ref 82–98)
PLATELET # BLD AUTO: 218 THOUSANDS/UL (ref 149–390)
PMV BLD AUTO: 10.3 FL (ref 8.9–12.7)
POTASSIUM SERPL-SCNC: 3.6 MMOL/L (ref 3.5–5.3)
RBC # BLD AUTO: 4.46 MILLION/UL (ref 3.88–5.12)
SODIUM SERPL-SCNC: 140 MMOL/L (ref 135–147)
VALPROATE SERPL-MCNC: 94 ÂΜG/ML (ref 50–125)
WBC # BLD AUTO: 9.62 THOUSAND/UL (ref 4.31–10.16)

## 2025-07-23 PROCEDURE — 99255 IP/OBS CONSLTJ NEW/EST HI 80: CPT | Performed by: STUDENT IN AN ORGANIZED HEALTH CARE EDUCATION/TRAINING PROGRAM

## 2025-07-23 PROCEDURE — 70496 CT ANGIOGRAPHY HEAD: CPT

## 2025-07-23 PROCEDURE — 80164 ASSAY DIPROPYLACETIC ACD TOT: CPT | Performed by: PHYSICIAN ASSISTANT

## 2025-07-23 PROCEDURE — 85027 COMPLETE CBC AUTOMATED: CPT | Performed by: PHYSICIAN ASSISTANT

## 2025-07-23 PROCEDURE — 36415 COLL VENOUS BLD VENIPUNCTURE: CPT | Performed by: PHYSICIAN ASSISTANT

## 2025-07-23 PROCEDURE — 99232 SBSQ HOSP IP/OBS MODERATE 35: CPT | Performed by: PHYSICIAN ASSISTANT

## 2025-07-23 PROCEDURE — 70498 CT ANGIOGRAPHY NECK: CPT

## 2025-07-23 PROCEDURE — 80048 BASIC METABOLIC PNL TOTAL CA: CPT | Performed by: PHYSICIAN ASSISTANT

## 2025-07-23 RX ORDER — DOCUSATE SODIUM 100 MG/1
100 CAPSULE, LIQUID FILLED ORAL 2 TIMES DAILY
Status: DISCONTINUED | OUTPATIENT
Start: 2025-07-23 | End: 2025-07-26 | Stop reason: HOSPADM

## 2025-07-23 RX ORDER — SENNOSIDES 8.6 MG
2 TABLET ORAL
Status: DISCONTINUED | OUTPATIENT
Start: 2025-07-23 | End: 2025-07-26 | Stop reason: HOSPADM

## 2025-07-23 RX ADMIN — DIVALPROEX SODIUM 750 MG: 500 TABLET, DELAYED RELEASE ORAL at 21:45

## 2025-07-23 RX ADMIN — DOCUSATE SODIUM 100 MG: 100 CAPSULE, LIQUID FILLED ORAL at 12:59

## 2025-07-23 RX ADMIN — ACETAMINOPHEN 975 MG: 325 TABLET ORAL at 21:46

## 2025-07-23 RX ADMIN — SENNOSIDES 17.2 MG: 8.6 TABLET, FILM COATED ORAL at 21:45

## 2025-07-23 RX ADMIN — DIVALPROEX SODIUM 750 MG: 500 TABLET, DELAYED RELEASE ORAL at 14:31

## 2025-07-23 RX ADMIN — CHLORHEXIDINE GLUCONATE 15 ML: 1.2 SOLUTION ORAL at 21:46

## 2025-07-23 RX ADMIN — DIVALPROEX SODIUM 750 MG: 500 TABLET, DELAYED RELEASE ORAL at 05:45

## 2025-07-23 RX ADMIN — IOHEXOL 65 ML: 350 INJECTION, SOLUTION INTRAVENOUS at 16:03

## 2025-07-23 RX ADMIN — ENOXAPARIN SODIUM 30 MG: 100 INJECTION SUBCUTANEOUS at 08:42

## 2025-07-23 RX ADMIN — ENOXAPARIN SODIUM 30 MG: 100 INJECTION SUBCUTANEOUS at 21:46

## 2025-07-23 RX ADMIN — DOCUSATE SODIUM 100 MG: 100 CAPSULE, LIQUID FILLED ORAL at 21:47

## 2025-07-24 ENCOUNTER — APPOINTMENT (INPATIENT)
Dept: NEUROLOGY | Facility: CLINIC | Age: 40
DRG: 055 | End: 2025-07-24
Attending: PHYSICIAN ASSISTANT
Payer: COMMERCIAL

## 2025-07-24 LAB
ALBUMIN SERPL BCG-MCNC: 3.7 G/DL (ref 3.5–5)
ALP SERPL-CCNC: 49 U/L (ref 34–104)
ALT SERPL W P-5'-P-CCNC: 16 U/L (ref 7–52)
ANION GAP SERPL CALCULATED.3IONS-SCNC: 8 MMOL/L (ref 4–13)
AST SERPL W P-5'-P-CCNC: 29 U/L (ref 13–39)
BASOPHILS # BLD AUTO: 0.05 THOUSANDS/ÂΜL (ref 0–0.1)
BASOPHILS NFR BLD AUTO: 1 % (ref 0–1)
BILIRUB SERPL-MCNC: 0.42 MG/DL (ref 0.2–1)
BUN SERPL-MCNC: 11 MG/DL (ref 5–25)
CALCIUM SERPL-MCNC: 9.1 MG/DL (ref 8.4–10.2)
CHLORIDE SERPL-SCNC: 103 MMOL/L (ref 96–108)
CO2 SERPL-SCNC: 29 MMOL/L (ref 21–32)
CREAT SERPL-MCNC: 0.83 MG/DL (ref 0.6–1.3)
EOSINOPHIL # BLD AUTO: 0.19 THOUSAND/ÂΜL (ref 0–0.61)
EOSINOPHIL NFR BLD AUTO: 2 % (ref 0–6)
ERYTHROCYTE [DISTWIDTH] IN BLOOD BY AUTOMATED COUNT: 14 % (ref 11.6–15.1)
GLUCOSE SERPL-MCNC: 88 MG/DL (ref 65–140)
HCT VFR BLD AUTO: 46.1 % (ref 36.5–46.1)
HGB BLD-MCNC: 14.5 G/DL (ref 12–15.4)
IMM GRANULOCYTES # BLD AUTO: 0.05 THOUSAND/UL (ref 0–0.2)
IMM GRANULOCYTES NFR BLD AUTO: 1 % (ref 0–2)
LYMPHOCYTES # BLD AUTO: 3.7 THOUSANDS/ÂΜL (ref 0.6–4.47)
LYMPHOCYTES NFR BLD AUTO: 45 % (ref 14–44)
MCH RBC QN AUTO: 30.1 PG (ref 26.8–34.3)
MCHC RBC AUTO-ENTMCNC: 31.5 G/DL (ref 31.4–37.4)
MCV RBC AUTO: 96 FL (ref 82–98)
MONOCYTES # BLD AUTO: 0.59 THOUSAND/ÂΜL (ref 0.17–1.22)
MONOCYTES NFR BLD AUTO: 7 % (ref 4–12)
NEUTROPHILS # BLD AUTO: 3.54 THOUSANDS/ÂΜL (ref 1.85–7.62)
NEUTS SEG NFR BLD AUTO: 44 % (ref 43–75)
NRBC BLD AUTO-RTO: 0 /100 WBCS
PLATELET # BLD AUTO: 249 THOUSANDS/UL (ref 149–390)
PMV BLD AUTO: 10.5 FL (ref 8.9–12.7)
POTASSIUM SERPL-SCNC: 4 MMOL/L (ref 3.5–5.3)
PROT SERPL-MCNC: 6.1 G/DL (ref 6.4–8.4)
RBC # BLD AUTO: 4.82 MILLION/UL (ref 3.88–5.12)
SODIUM SERPL-SCNC: 140 MMOL/L (ref 135–147)
WBC # BLD AUTO: 8.12 THOUSAND/UL (ref 4.31–10.16)

## 2025-07-24 PROCEDURE — 85025 COMPLETE CBC W/AUTO DIFF WBC: CPT | Performed by: PHYSICIAN ASSISTANT

## 2025-07-24 PROCEDURE — 97167 OT EVAL HIGH COMPLEX 60 MIN: CPT

## 2025-07-24 PROCEDURE — 80053 COMPREHEN METABOLIC PANEL: CPT | Performed by: PHYSICIAN ASSISTANT

## 2025-07-24 PROCEDURE — 99232 SBSQ HOSP IP/OBS MODERATE 35: CPT | Performed by: NURSE PRACTITIONER

## 2025-07-24 PROCEDURE — 97129 THER IVNTJ 1ST 15 MIN: CPT

## 2025-07-24 PROCEDURE — 99232 SBSQ HOSP IP/OBS MODERATE 35: CPT | Performed by: STUDENT IN AN ORGANIZED HEALTH CARE EDUCATION/TRAINING PROGRAM

## 2025-07-24 PROCEDURE — 97535 SELF CARE MNGMENT TRAINING: CPT

## 2025-07-24 PROCEDURE — 97163 PT EVAL HIGH COMPLEX 45 MIN: CPT

## 2025-07-24 PROCEDURE — 95819 EEG AWAKE AND ASLEEP: CPT | Performed by: STUDENT IN AN ORGANIZED HEALTH CARE EDUCATION/TRAINING PROGRAM

## 2025-07-24 PROCEDURE — 95816 EEG AWAKE AND DROWSY: CPT

## 2025-07-24 RX ORDER — QUETIAPINE FUMARATE 25 MG/1
25 TABLET, FILM COATED ORAL
Status: DISCONTINUED | OUTPATIENT
Start: 2025-07-24 | End: 2025-07-26 | Stop reason: HOSPADM

## 2025-07-24 RX ADMIN — CHLORHEXIDINE GLUCONATE 15 ML: 1.2 SOLUTION ORAL at 21:42

## 2025-07-24 RX ADMIN — CHLORHEXIDINE GLUCONATE 15 ML: 1.2 SOLUTION ORAL at 08:56

## 2025-07-24 RX ADMIN — DOCUSATE SODIUM 100 MG: 100 CAPSULE, LIQUID FILLED ORAL at 16:32

## 2025-07-24 RX ADMIN — DIVALPROEX SODIUM 750 MG: 500 TABLET, DELAYED RELEASE ORAL at 13:38

## 2025-07-24 RX ADMIN — ACETAMINOPHEN 975 MG: 325 TABLET ORAL at 19:30

## 2025-07-24 RX ADMIN — QUETIAPINE FUMARATE 25 MG: 25 TABLET ORAL at 21:42

## 2025-07-24 RX ADMIN — DIVALPROEX SODIUM 750 MG: 500 TABLET, DELAYED RELEASE ORAL at 05:20

## 2025-07-24 RX ADMIN — DOCUSATE SODIUM 100 MG: 100 CAPSULE, LIQUID FILLED ORAL at 08:56

## 2025-07-24 RX ADMIN — SENNOSIDES 17.2 MG: 8.6 TABLET, FILM COATED ORAL at 21:42

## 2025-07-24 RX ADMIN — ENOXAPARIN SODIUM 30 MG: 100 INJECTION SUBCUTANEOUS at 21:42

## 2025-07-24 RX ADMIN — DIVALPROEX SODIUM 750 MG: 500 TABLET, DELAYED RELEASE ORAL at 21:42

## 2025-07-24 RX ADMIN — ENOXAPARIN SODIUM 30 MG: 100 INJECTION SUBCUTANEOUS at 08:56

## 2025-07-24 NOTE — TELEPHONE ENCOUNTER
07/25/2025- PT STILL IN HOSPITAL    07/24/2025- PT IS IN [facility] HOSPITAL

## 2025-07-25 LAB
ANION GAP SERPL CALCULATED.3IONS-SCNC: 7 MMOL/L (ref 4–13)
BUN SERPL-MCNC: 13 MG/DL (ref 5–25)
CALCIUM SERPL-MCNC: 8.9 MG/DL (ref 8.4–10.2)
CHLORIDE SERPL-SCNC: 103 MMOL/L (ref 96–108)
CO2 SERPL-SCNC: 31 MMOL/L (ref 21–32)
CREAT SERPL-MCNC: 0.75 MG/DL (ref 0.6–1.3)
ERYTHROCYTE [DISTWIDTH] IN BLOOD BY AUTOMATED COUNT: 13.7 % (ref 11.6–15.1)
GLUCOSE SERPL-MCNC: 97 MG/DL (ref 65–140)
HCT VFR BLD AUTO: 44 % (ref 36.5–46.1)
HGB BLD-MCNC: 14 G/DL (ref 12–15.4)
MCH RBC QN AUTO: 29.5 PG (ref 26.8–34.3)
MCHC RBC AUTO-ENTMCNC: 31.8 G/DL (ref 31.4–37.4)
MCV RBC AUTO: 93 FL (ref 82–98)
PLATELET # BLD AUTO: 241 THOUSANDS/UL (ref 149–390)
PMV BLD AUTO: 10.3 FL (ref 8.9–12.7)
POTASSIUM SERPL-SCNC: 3.4 MMOL/L (ref 3.5–5.3)
RBC # BLD AUTO: 4.75 MILLION/UL (ref 3.88–5.12)
SODIUM SERPL-SCNC: 141 MMOL/L (ref 135–147)
WBC # BLD AUTO: 8.25 THOUSAND/UL (ref 4.31–10.16)

## 2025-07-25 PROCEDURE — 85027 COMPLETE CBC AUTOMATED: CPT

## 2025-07-25 PROCEDURE — 99232 SBSQ HOSP IP/OBS MODERATE 35: CPT

## 2025-07-25 PROCEDURE — 80048 BASIC METABOLIC PNL TOTAL CA: CPT

## 2025-07-25 RX ORDER — POTASSIUM CHLORIDE 1500 MG/1
40 TABLET, EXTENDED RELEASE ORAL ONCE
Status: COMPLETED | OUTPATIENT
Start: 2025-07-25 | End: 2025-07-25

## 2025-07-25 RX ADMIN — ENOXAPARIN SODIUM 30 MG: 100 INJECTION SUBCUTANEOUS at 21:01

## 2025-07-25 RX ADMIN — ENOXAPARIN SODIUM 30 MG: 100 INJECTION SUBCUTANEOUS at 09:35

## 2025-07-25 RX ADMIN — QUETIAPINE FUMARATE 25 MG: 25 TABLET ORAL at 21:00

## 2025-07-25 RX ADMIN — SENNOSIDES 17.2 MG: 8.6 TABLET, FILM COATED ORAL at 21:00

## 2025-07-25 RX ADMIN — DIVALPROEX SODIUM 750 MG: 500 TABLET, DELAYED RELEASE ORAL at 21:00

## 2025-07-25 RX ADMIN — DIVALPROEX SODIUM 750 MG: 500 TABLET, DELAYED RELEASE ORAL at 05:25

## 2025-07-25 RX ADMIN — DIVALPROEX SODIUM 750 MG: 500 TABLET, DELAYED RELEASE ORAL at 14:00

## 2025-07-25 RX ADMIN — DOCUSATE SODIUM 100 MG: 100 CAPSULE, LIQUID FILLED ORAL at 09:35

## 2025-07-25 RX ADMIN — POTASSIUM CHLORIDE 40 MEQ: 1500 TABLET, EXTENDED RELEASE ORAL at 09:35

## 2025-07-25 RX ADMIN — CHLORHEXIDINE GLUCONATE 15 ML: 1.2 SOLUTION ORAL at 09:35

## 2025-07-25 RX ADMIN — CHLORHEXIDINE GLUCONATE 15 ML: 1.2 SOLUTION ORAL at 21:01

## 2025-07-26 ENCOUNTER — APPOINTMENT (INPATIENT)
Dept: RADIOLOGY | Facility: HOSPITAL | Age: 40
DRG: 055 | End: 2025-07-26
Payer: COMMERCIAL

## 2025-07-26 VITALS
HEIGHT: 66 IN | OXYGEN SATURATION: 98 % | HEART RATE: 83 BPM | TEMPERATURE: 97.4 F | RESPIRATION RATE: 18 BRPM | DIASTOLIC BLOOD PRESSURE: 57 MMHG | BODY MASS INDEX: 25.26 KG/M2 | SYSTOLIC BLOOD PRESSURE: 90 MMHG | WEIGHT: 157.19 LBS

## 2025-07-26 PROCEDURE — 72141 MRI NECK SPINE W/O DYE: CPT

## 2025-07-26 PROCEDURE — 70551 MRI BRAIN STEM W/O DYE: CPT

## 2025-07-26 PROCEDURE — 99238 HOSP IP/OBS DSCHRG MGMT 30/<: CPT | Performed by: NURSE PRACTITIONER

## 2025-07-26 PROCEDURE — 99232 SBSQ HOSP IP/OBS MODERATE 35: CPT | Performed by: STUDENT IN AN ORGANIZED HEALTH CARE EDUCATION/TRAINING PROGRAM

## 2025-07-26 RX ORDER — ACETAMINOPHEN 325 MG/1
975 TABLET ORAL EVERY 6 HOURS PRN
Qty: 30 TABLET | Refills: 0 | Status: SHIPPED | OUTPATIENT
Start: 2025-07-26 | End: 2025-08-02

## 2025-07-26 RX ORDER — DIVALPROEX SODIUM 250 MG/1
750 TABLET, DELAYED RELEASE ORAL EVERY 8 HOURS SCHEDULED
Qty: 270 TABLET | Refills: 0 | Status: SHIPPED | OUTPATIENT
Start: 2025-07-26 | End: 2025-08-25

## 2025-07-26 RX ORDER — DOCUSATE SODIUM 100 MG/1
100 CAPSULE, LIQUID FILLED ORAL 2 TIMES DAILY
Qty: 14 CAPSULE | Refills: 0 | Status: SHIPPED | OUTPATIENT
Start: 2025-07-26 | End: 2025-08-02

## 2025-07-26 RX ADMIN — ENOXAPARIN SODIUM 30 MG: 100 INJECTION SUBCUTANEOUS at 08:08

## 2025-07-26 RX ADMIN — DIVALPROEX SODIUM 750 MG: 500 TABLET, DELAYED RELEASE ORAL at 05:55

## 2025-07-30 NOTE — TELEPHONE ENCOUNTER
7/30/25 - CALLED PT AND SPOKE WITH PT'S WIFE SHELTON CONFIRMING 08/11/2025 APT IN [facility]/ [facility] NEEDED PRIOR. TRANSFERRED SHELTON TO CENTRAL SCHEDULING TO SCHEDULE STUDY.  **WAITING FOR CT TO BE SCHEDULED**    07/28/2025- PT WAS DISCHARGED HOME. CALLED PT AND SPOKE WITH PT'S WIFE CONFIRMING 08/11/2025 APT IN [facility]/ [facility] NEEDED PRIOR. PT'S WIFE WAS PROVIDED WITH CENTRAL SCHEDULING PHONE NUMBER TO SCHEDULE THE STUDY.    **WAITING FOR CTH TO BE SCHEDULED**    07/25/2025- PT STILL IN HOSPITAL    07/24/2025- PT IS IN [facility] HOSPITAL

## 2025-08-07 ENCOUNTER — HOSPITAL ENCOUNTER (OUTPATIENT)
Dept: RADIOLOGY | Facility: HOSPITAL | Age: 40
Discharge: HOME | End: 2025-08-07
Attending: PHYSICIAN ASSISTANT

## 2025-08-07 DIAGNOSIS — S06.5XAA SDH (SUBDURAL HEMATOMA) (HCC): ICD-10-CM
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
## 2025-08-08 ENCOUNTER — HOSPITAL ENCOUNTER (OUTPATIENT)
Dept: RADIOLOGY | Facility: HOSPITAL | Age: 40
Discharge: HOME | End: 2025-08-08
Attending: PHYSICIAN ASSISTANT
Payer: COMMERCIAL
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
## 2025-08-08 DIAGNOSIS — S06.5XAA SDH (SUBDURAL HEMATOMA) (HCC): ICD-10-CM

## 2025-08-08 PROCEDURE — 70450 CT HEAD/BRAIN W/O DYE: CPT

## 2025-08-11 ENCOUNTER — TELEPHONE (OUTPATIENT)
Dept: NEUROSURGERY | Facility: CLINIC | Age: 40
End: 2025-08-11

## 2025-08-11 ENCOUNTER — RESULTS FOLLOW-UP (OUTPATIENT)
Dept: NEUROSURGERY | Facility: CLINIC | Age: 40
End: 2025-08-11

## 2025-08-11 ENCOUNTER — OFFICE VISIT (OUTPATIENT)
Dept: NEUROSURGERY | Facility: CLINIC | Age: 40
End: 2025-08-11
Payer: COMMERCIAL

## 2025-08-11 ENCOUNTER — HOSPITAL ENCOUNTER (OUTPATIENT)
Dept: RADIOLOGY | Facility: HOSPITAL | Age: 40
Discharge: HOME | End: 2025-08-11
Attending: PHYSICIAN ASSISTANT
Payer: COMMERCIAL

## 2025-08-11 ENCOUNTER — APPOINTMENT (OUTPATIENT)
Dept: RADIOLOGY | Facility: HOSPITAL | Age: 40
End: 2025-08-11
Payer: COMMERCIAL